# Patient Record
Sex: FEMALE | Employment: OTHER | URBAN - METROPOLITAN AREA
[De-identification: names, ages, dates, MRNs, and addresses within clinical notes are randomized per-mention and may not be internally consistent; named-entity substitution may affect disease eponyms.]

---

## 2019-09-10 ENCOUNTER — OFFICE VISIT (OUTPATIENT)
Dept: ORTHOPEDIC SURGERY | Age: 79
End: 2019-09-10

## 2019-09-10 VITALS
HEART RATE: 68 BPM | SYSTOLIC BLOOD PRESSURE: 121 MMHG | WEIGHT: 113.4 LBS | RESPIRATION RATE: 16 BRPM | DIASTOLIC BLOOD PRESSURE: 71 MMHG | OXYGEN SATURATION: 97 % | HEIGHT: 60 IN | TEMPERATURE: 96.1 F | BODY MASS INDEX: 22.26 KG/M2

## 2019-09-10 DIAGNOSIS — M75.102 LEFT ROTATOR CUFF TEAR ARTHROPATHY: Primary | ICD-10-CM

## 2019-09-10 DIAGNOSIS — M25.512 LEFT SHOULDER PAIN, UNSPECIFIED CHRONICITY: ICD-10-CM

## 2019-09-10 DIAGNOSIS — M12.812 LEFT ROTATOR CUFF TEAR ARTHROPATHY: Primary | ICD-10-CM

## 2019-09-10 RX ORDER — TRIAMCINOLONE ACETONIDE 40 MG/ML
40 INJECTION, SUSPENSION INTRA-ARTICULAR; INTRAMUSCULAR ONCE
Qty: 1 ML | Refills: 0
Start: 2019-09-10 | End: 2019-09-10

## 2019-09-10 RX ORDER — OMEPRAZOLE 40 MG/1
40 CAPSULE, DELAYED RELEASE ORAL DAILY
COMMUNITY
End: 2022-02-10

## 2019-09-10 RX ORDER — ATORVASTATIN CALCIUM 40 MG/1
40 TABLET, FILM COATED ORAL DAILY
COMMUNITY

## 2019-09-10 RX ORDER — LOSARTAN POTASSIUM 50 MG/1
50 TABLET ORAL DAILY
COMMUNITY

## 2019-09-10 NOTE — PROGRESS NOTES
I have individually seen and examined Romero Saini in addition to the physician assistant. Assessment:    ICD-10-CM ICD-9-CM    1. Left rotator cuff tear arthropathy M75.102 716.81 TRIAMCINOLONE ACETONIDE INJ    M12.812  triamcinolone acetonide (KENALOG) 40 mg/mL injection   2. Left shoulder pain, unspecified chronicity M25.512 719.41 AMB POC XRAY, SHOULDER; COMPLETE, 2+        IMAGING: XR of left shoulder dated 9/10/19 was reviewed and read: High riding humerus      Plan:  Pt presents today with left shoulder pain due to rotator cuff arthropathy. Her shoulder was injected and aspirated with ultrasound today. She will follow up in 2-3 weeks. RTC 2-3 weeks    Scribed by Yuriy Fay [address]) as dictated by Mai Fletcher MD     I, Dr. Mai Fletcher, confirm that all documentation is accurate.      Mai Fletcher M.D.   [facility]

## 2019-09-10 NOTE — PROGRESS NOTES
1. Have you been to the ER, urgent care clinic since your last visit? Hospitalized since your last visit? No    2. Have you seen or consulted any other health care providers outside of the [address] [place] [address] since your last visit? Include any pap smears or colon screening.  No

## 2019-09-10 NOTE — PROGRESS NOTES
Nicoals Lopes  1940   Chief Complaint   Patient presents with    Shoulder Pain     LEFT SHOULDER PAIN        HISTORY OF PRESENT ILLNESS  Nicolas Lopes is a 78 y.o. female who presents today for evaluation of left shoulder pain. She states she dislocated her shoulder 1 year ago. Was doing fine until the last 2 weeks. Noted some swelling and then saw some bruising over the last few days. Has a hard time lifting. . Patient describes the pain as aching and throbbing that is Intermittent in nature. Symptoms are worse with lifting and is better with  rest.  Associated symptoms include weakness, Swelling. Since problem started, it: has worsened. Pain does not wake patient up at night. Has taken nothing for the problem. Pain is a 5/10. Has tried following treatments: Injections:NO; Brace:NO;  Therapy:NO; Cane/Crutch:NO       No Known Allergies     Past Medical History:   Diagnosis Date    Hypertension       Social History     Socioeconomic History    Marital status: UNKNOWN     Spouse name: Not on file    Number of children: Not on file    Years of education: Not on file    Highest education level: Not on file   Occupational History    Not on file   Social Needs    Financial resource strain: Not on file    Food insecurity:     Worry: Not on file     Inability: Not on file    Transportation needs:     Medical: Not on file     Non-medical: Not on file   Tobacco Use    Smoking status: Never Smoker    Smokeless tobacco: Never Used   Substance and Sexual Activity    Alcohol use: Not on file    Drug use: Not on file    Sexual activity: Not on file   Lifestyle    Physical activity:     Days per week: Not on file     Minutes per session: Not on file    Stress: Not on file   Relationships    Social connections:     Talks on phone: Not on file     Gets together: Not on file     Attends Rastafari service: Not on file     Active member of club or organization: Not on file     Attends meetings of clubs or organizations: Not on file     Relationship status: Not on file    Intimate partner violence:     Fear of current or ex partner: Not on file     Emotionally abused: Not on file     Physically abused: Not on file     Forced sexual activity: Not on file   Other Topics Concern    Not on file   Social History Narrative    Not on file      History reviewed. No pertinent surgical history. Family History   Problem Relation Age of Onset    Hypertension Mother     Stroke Mother     Hypertension Father     Heart Attack Father       Current Outpatient Medications   Medication Sig    omeprazole (PRILOSEC) 40 mg capsule Take 40 mg by mouth daily.  losartan (COZAAR) 50 mg tablet Take  by mouth daily.  atorvastatin (LIPITOR) 40 mg tablet Take  by mouth daily.  calcium-cholecalciferol, d3, (CALCIUM 600 + D) 600-125 mg-unit tab Take  by mouth.  vit C/vit E ac/lut/copper/zinc (PRESERVISION LUTEIN PO) Take  by mouth. No current facility-administered medications for this visit. REVIEW OF SYSTEM   Patient denies: Weight loss, Fever/Chills, HA, Visual changes, Fatigue, Chest pain, SOB, Abdominal pain, N/V/D/C, Blood in stool or urine, Edema. Pertinent positive as above in HPI. All others were negative    PHYSICAL EXAM:   Visit Vitals  /71   Pulse 68   Temp 96.1 °F (35.6 °C) (Oral)   Resp 16   Ht 5' (1.524 m)   Wt 113 lb 6.4 oz (51.4 kg)   SpO2 97%   BMI 22.15 kg/m²     The patient is a well-developed, well-nourished female   in no acute distress. The patient is alert and oriented times three. The patient is alert and oriented times three. Mood and affect are normal.  LYMPHATIC: lymph nodes are not enlarged and are within normal limits  SKIN: normal in color and non tender to palpation. There are no bruises or abrasions noted. NEUROLOGICAL: Motor sensory exam is within normal limits. Reflexes are equal bilaterally.  There is normal sensation to pinprick and light touch  MUSCULOSKELETAL:  Examination Left shoulder   Skin Intact   AC joint tenderness -   Biceps tenderness -   Forward flexion/Elevation    Active abduction    Glenohumeral abduction 80   External rotation ROM 30   Internal rotation ROM 30   Apprehension -   Tracies Relocation -   Jerk -   Load and Shift -   Obriens -   Speeds -   Impingement sign -   Supraspinatus/Empty Can +   External Rotation Strength -, 5/5   Lift Off/Belly Press -, 5/5   Neurovascular Intact          PROCEDURE: Left shoulder Aspiration and Injection with Ultrasound Guidance     After sterile prep, left shoulder was aspirated. 10 cc of bloody serous fluid were obtained under ultrasound guidance. The fluid was discarded. After sterile prep, 6 cc of Xylocaine and 1 cc of Kenalog were injected into the left shoulder. Ultrasound images captured using Scloby Ultrasound machine and scanned into patient's chart.        [facility] [place]  OFFICE PROCEDURE PROGRESS NOTE        Chart reviewed for the following:  Kori Hoover M.D, have reviewed the History, Physical and updated the Allergic reactions for Christy Maza performed immediately prior to start of procedure:  Kori Hoover M.D, have performed the following reviews on Kevon Lew prior to the start of the procedure:            * Patient was identified by name and date of birth   * Agreement on procedure being performed was verified  * Risks and Benefits explained to the patient  * Procedure site verified and marked as necessary  * Patient was positioned for comfort  * Consent was signed and verified     Time: 1:44 PM     Date of procedure: 9/10/2019    Procedure performed by:  Santo Pinedo M.D    Provider assisted by: (see medication administration)    How tolerated by patient: tolerated the procedure well with no complications    Comments: none        IMAGING: 3 view xray images of left shoulder  on 9/10/2019 read and reviewed by myself reveal high riding humerus c/w cuff tear arthropathy     IMPRESSION:      ICD-10-CM ICD-9-CM    1. Left rotator cuff tear arthropathy M75.102 716.81     M12.812     2. Left shoulder pain, unspecified chronicity M25.512 719.41 AMB POC XRAY, SHOULDER; COMPLETE, 2+        PLAN:   1. Patient with cuff tear arthropathy and hemarthrosis. Shoulder examined under ultrasound aspirated and injected with cortisone. All activities as tolerated  Risk factors include: previous dislocation  2. Yes cortisone injection indicated today   3. No Physical/Occupational Therapy indicated today  4. No diagnostic test indicated today:   5. No durable medical equipment indicated today  6. No referral indicated today   7. No medications indicated today:   8.  No Narcotic indicated today   RTC 3 weeks     Claudetta Pollock, PA-C [facility]

## 2019-09-12 ENCOUNTER — OFFICE VISIT (OUTPATIENT)
Dept: ORTHOPEDIC SURGERY | Age: 79
End: 2019-09-12

## 2019-09-12 ENCOUNTER — HOSPITAL ENCOUNTER (OUTPATIENT)
Age: 79
Discharge: HOME | End: 2019-09-12
Attending: PHYSICIAN ASSISTANT
Payer: MEDICARE

## 2019-09-12 VITALS
BODY MASS INDEX: 22.1 KG/M2 | WEIGHT: 112.6 LBS | TEMPERATURE: 96.2 F | DIASTOLIC BLOOD PRESSURE: 97 MMHG | HEIGHT: 60 IN | HEART RATE: 82 BPM | OXYGEN SATURATION: 99 % | SYSTOLIC BLOOD PRESSURE: 189 MMHG | RESPIRATION RATE: 15 BRPM

## 2019-09-12 DIAGNOSIS — M12.812 LEFT ROTATOR CUFF TEAR ARTHROPATHY: Primary | ICD-10-CM

## 2019-09-12 DIAGNOSIS — M25.512 LEFT SHOULDER PAIN, UNSPECIFIED CHRONICITY: ICD-10-CM

## 2019-09-12 DIAGNOSIS — M12.812 LEFT ROTATOR CUFF TEAR ARTHROPATHY: ICD-10-CM

## 2019-09-12 DIAGNOSIS — M75.102 LEFT ROTATOR CUFF TEAR ARTHROPATHY: Primary | ICD-10-CM

## 2019-09-12 DIAGNOSIS — M75.102 LEFT ROTATOR CUFF TEAR ARTHROPATHY: ICD-10-CM

## 2019-09-12 PROCEDURE — 73221 MRI JOINT UPR EXTREM W/O DYE: CPT

## 2019-09-12 NOTE — PROGRESS NOTES
1. Have you been to the ER, urgent care clinic since your last visit? Hospitalized since your last visit? No    2. Have you seen or consulted any other health care providers outside of the [address] [place] [address] since your last visit? Include any pap smears or colon screening.  No

## 2019-09-12 NOTE — PROGRESS NOTES
Patsy Barnes  1940   Chief Complaint   Patient presents with    Arm Pain     LEFT ARM PAIN        HISTORY OF PRESENT ILLNESS  Patsy Barnes is a 78 y.o. female who presents today for evaluation of left shoulder pain. . She notes in the last day or two her swelling has now moved to her arm and the bruising is worse. Pain is worse when she tries to lift her arm or lift any object. Night pain Is present. Patient denies any fever, chills, chest pain, shortness of breath or calf pain. The remainder of the review of systems is negative. There are no new illness or injuries to report since last seen in the office. No changes in medications, allergies, social or family history. PHYSICAL EXAM:   Visit Vitals  BP (!) 189/97   Pulse 82   Temp 96.2 °F (35.7 °C) (Oral)   Resp 15   Ht 5' (1.524 m)   Wt 112 lb 9.6 oz (51.1 kg)   SpO2 99%   BMI 21.99 kg/m²     The patient is a well-developed, well-nourished female   in no acute distress. The patient is alert and oriented times three. The patient is alert and oriented times three. Mood and affect are normal.  LYMPHATIC: lymph nodes are not enlarged and are within normal limits  SKIN: normal in color and non tender to palpation. There are no bruises or abrasions noted. NEUROLOGICAL: Motor sensory exam is within normal limits. Reflexes are equal bilaterally.  There is normal sensation to pinprick and light touch  MUSCULOSKELETAL:  Examination Left shoulder   Skin Intact, swelling and bruising noted to arm   AC joint tenderness -   Biceps tenderness -   Forward flexion/Elevation    Active abduction    Glenohumeral abduction 80   External rotation ROM 30   Internal rotation ROM 30   Apprehension -   Tracies Relocation -   Jerk -   Load and Shift -   Obriens -   Speeds -   Impingement sign -   Supraspinatus/Empty Can +   External Rotation Strength -, 5/5   Lift Off/Belly Press -, 5/5   Neurovascular Intact              IMPRESSION:      ICD-10-CM ICD-9-CM    1. Left rotator cuff tear arthropathy M75.102 716.81 MRI SHOULDER LT WO CONT    M12.812  CANCELED: MRI SHOULDER RT WO CONT   2. Left shoulder pain, unspecified chronicity M25.512 719.41 MRI SHOULDER LT WO CONT      CANCELED: MRI SHOULDER RT WO CONT        PLAN:   1. Patient with cuff tear arthropathy and hemarthrosis worsening. Will get an MRI today to eval soft tissues  Risk factors include: previous dislocation  2. No cortisone injection indicated today   3. No Physical/Occupational Therapy indicated today  4. YES diagnostic test indicated today: MRI  5. No durable medical equipment indicated today  6. No referral indicated today   7. No medications indicated today:   8.  No Narcotic indicated today   RTC after MRI    [facility]

## 2019-09-16 ENCOUNTER — OFFICE VISIT (OUTPATIENT)
Dept: ORTHOPEDIC SURGERY | Age: 79
End: 2019-09-16

## 2019-09-16 VITALS
BODY MASS INDEX: 22.74 KG/M2 | TEMPERATURE: 96.8 F | OXYGEN SATURATION: 99 % | RESPIRATION RATE: 12 BRPM | WEIGHT: 115.8 LBS | HEART RATE: 85 BPM | HEIGHT: 60 IN | SYSTOLIC BLOOD PRESSURE: 140 MMHG | DIASTOLIC BLOOD PRESSURE: 71 MMHG

## 2019-09-16 DIAGNOSIS — M12.812 LEFT ROTATOR CUFF TEAR ARTHROPATHY: Primary | ICD-10-CM

## 2019-09-16 DIAGNOSIS — M75.102 LEFT ROTATOR CUFF TEAR ARTHROPATHY: Primary | ICD-10-CM

## 2019-09-16 NOTE — PROGRESS NOTES
1. Have you been to the ER, urgent care clinic since your last visit? Hospitalized since your last visit? No    2. Have you seen or consulted any other health care providers outside of the [address] [place] [address] since your last visit? Include any pap smears or colon screening.  No

## 2019-09-16 NOTE — PROGRESS NOTES
Tracy Gonzalez  1940   Chief Complaint   Patient presents with    Shoulder Pain     left shoulder        HISTORY OF PRESENT ILLNESS  Tracy Gonzalez is a 78 y.o. female who presents today for evaluation of left shoulder pain and MRI review. Pt rates pain as 0/10 today. Pt fell over a year ago but the pain has worsened over the last couple of weeks. Pt has been caring for her  which may have exacerbated the pain. She states she has been lifting him and pushing his wheelchair, which aggravates the pain. Pain is worse when she tries to lift her arm or lift any object. Night pain Is present. There is still swelling in the shoulder today. Surgery was discussed with the patient today but they would like to think about it before moving forward. Patient denies any fever, chills, chest pain, shortness of breath or calf pain. The remainder of the review of systems is negative. There are no new illness or injuries to report since last seen in the office. No changes in medications, allergies, social or family history. PHYSICAL EXAM:   Visit Vitals  /71   Pulse 85   Temp 96.8 °F (36 °C)   Resp 12   Ht 5' (1.524 m)   Wt 115 lb 12.8 oz (52.5 kg)   SpO2 99%   BMI 22.62 kg/m²     The patient is a well-developed, well-nourished female   in no acute distress. The patient is alert and oriented times three. The patient is alert and oriented times three. Mood and affect are normal.  LYMPHATIC: lymph nodes are not enlarged and are within normal limits  SKIN: normal in color and non tender to palpation. There are no bruises or abrasions noted. NEUROLOGICAL: Motor sensory exam is within normal limits. Reflexes are equal bilaterally.  There is normal sensation to pinprick and light touch  MUSCULOSKELETAL:  Examination Left shoulder   Skin Intact, swelling and bruising noted to arm   AC joint tenderness -   Biceps tenderness -   Forward flexion/Elevation    Active abduction    Glenohumeral abduction 80   External rotation ROM 30   Internal rotation ROM 30   Apprehension -   Tracies Relocation -   Jerk -   Load and Shift -   Obriens -   Speeds -   Impingement sign -   Supraspinatus/Empty Can +   External Rotation Strength -, 5/5   Lift Off/Belly Press -, 5/5   Neurovascular Intact        IMAGING: MRI of left shoulder dated 9/12/19 was reviewed and read:   IMPRESSION:   1. Large-caliber full-thickness rotator cuff tear involving most if not all of  both supraspinatus and infraspinatus. Both muscles with significant atrophy. 2. Longitudinal split tear is suspected within distal superior supraspinatus. 3. Medial subluxation of biceps tendon. 4. Poor resolution of biceps labral anchor. There is superior labral  degenerative morphology tearing. 5. Moderate AC joint arthritis. 6. Moderate volume communicating bursitis and joint effusion. IMPRESSION:      ICD-10-CM ICD-9-CM    1. Left rotator cuff tear arthropathy M75.102 716.81     M12.812          PLAN:   1. Patient presents today with left shoulder pain due to MRI-documented rotator cuff tear arthropathy. Surgery was discussed with the patient today but they would like to think about it before moving forward. Risk factors include: previous dislocation  2. No cortisone injection indicated today   3. No Physical/Occupational Therapy indicated today  4. No diagnostic test indicated today:   5. No durable medical equipment indicated today  6. No referral indicated today   7. No medications indicated today:   8. No Narcotic indicated today       RTC after deciding on surgery    Scribed by [address]) as dictated by Padilla Johnson MD    I, Dr. Padilla Johnson, confirm that all documentation is accurate.     Padilla Johnson M.D.   [facility]

## 2019-09-19 ENCOUNTER — TELEPHONE (OUTPATIENT)
Dept: ORTHOPEDIC SURGERY | Age: 79
End: 2019-09-19

## 2019-09-19 NOTE — TELEPHONE ENCOUNTER
Patient seen 09/16/2019 for STAT mri results. Patient needs to know what can she do for the knot on her shldr. Should she use ice? Or what can be done?   Please call her today asap at [phone]

## 2020-07-28 ENCOUNTER — HOSPITAL ENCOUNTER (OUTPATIENT)
Dept: MAMMOGRAPHY | Age: 80
Discharge: HOME | End: 2020-07-28
Attending: INTERNAL MEDICINE
Payer: MEDICARE

## 2020-07-28 DIAGNOSIS — Z12.31 VISIT FOR SCREENING MAMMOGRAM: ICD-10-CM

## 2020-07-28 PROCEDURE — 77063 BREAST TOMOSYNTHESIS BI: CPT

## 2020-11-18 ENCOUNTER — APPOINTMENT (OUTPATIENT)
Dept: PHYSICAL THERAPY | Age: 80
End: 2020-11-18

## 2020-11-20 ENCOUNTER — APPOINTMENT (OUTPATIENT)
Dept: PHYSICAL THERAPY | Age: 80
End: 2020-11-20

## 2020-12-04 ENCOUNTER — HOSPITAL ENCOUNTER (OUTPATIENT)
Dept: PHYSICAL THERAPY | Age: 80
Discharge: HOME | End: 2020-12-04
Payer: MEDICARE

## 2020-12-04 PROCEDURE — 97161 PT EVAL LOW COMPLEX 20 MIN: CPT

## 2020-12-04 PROCEDURE — 97530 THERAPEUTIC ACTIVITIES: CPT

## 2020-12-04 NOTE — PROGRESS NOTES
[facility], [address] [place] [address]  [phone] fax  Plan of Care/ Statement of Necessity for Physical Therapy Services    Patient name: Jerry Simental Start of Care: 2020   Referral source: Phu Shannon MD : 1940    Medical Diagnosis: Low back pain [M54.5]  Scoliosis [M41.9]  Payor: Mackenzie Justice / Plan: [place] MED[place] A & B / Product Type: Medicare /  Onset Date:10/28/20    Treatment Diagnosis: low back pain, posterior right hip pain   Prior Hospitalization: see medical history Provider#: [id number]   Medications: Verified on Patient summary List    Comorbidities: HTN, visually impaired, hearing impaired, left shoulder pain   Prior Level of Function: functionally independent, caring for  at home who requires help with transfer/safety with gait      The Plan of Care and following information is based on the information from the initial evaluation. Assessment/ key information: Pt is a pleasant 78 y.o. female who presents with c/o low back pain and right hip pain. The patient reports an insidious onset of low back pain and posterior right hip pain over the past 6-8 months that is exacerbated by prolonged sitting and assisting her  at home with transfers. Signs/symptoms at eval consistent with mechanical low back pain, likely multifactorial in nature due to impaired hip strength, increased sedentary nature of lifestyle, and difficulty helping her  with transfers/gait. Functional deficits include: impaired B hip extension strength, difficulty lifting her dog without pain, and restrictions in ability to assist her  due to strength limitations. Rehab potential is good due to pt's desire to return to PLOF. Pt would benefit from skilled PT to address above deficits to improve Pt's function and ability to return to PLOF household tasks with decreased pain.     Evaluation Complexity History HIGH Complexity :3+ comorbidities / personal factors will impact the outcome/ POC ; Examination LOW Complexity : 1-2 Standardized tests and measures addressing body structure, function, activity limitation and / or participation in recreation  ;Presentation LOW Complexity : Stable, uncomplicated  ;Clinical Decision Making MEDIUM Complexity : FOTO score of 26-74  Overall Complexity Rating: LOW   Problem List: pain affecting function, decrease ROM, decrease strength, impaired gait/ balance, decrease ADL/ functional abilitiies, decrease activity tolerance, decrease flexibility/ joint mobility and decrease transfer abilities   Treatment Plan may include any combination of the following: Therapeutic exercise, Therapeutic activities, Neuromuscular re-education, Physical agent/modality, Gait/balance training, Manual therapy, Aquatic therapy, Patient education, Self Care training, Functional mobility training, Home safety training and Stair training  Patient / Family readiness to learn indicated by: asking questions  Persons(s) to be included in education: patient (P)  Barriers to Learning/Limitations: None  Patient Goal (s): decrease pain  Patient Self Reported Health Status: good  Rehabilitation Potential: good    Short Term Goals: To be accomplished in 1 week  - Goal: Pt to be compliant with initial HEP to improve hip strength to improve ease of transfers. Status at last note/certification: Established and reviewed with Pt  Long Term Goals: To be accomplished in 10 treatments  - Goal: Pt to perform 5 floor<>waist lifts with 20 lbs resistance without increased low back pain to facilitate ease with lifting her dog at home. Status at last note/certification: pain lifting her dog out of bed in the morning  - Goal: Pt to report at least 50% improvement in overall symptoms to improve ease of helping her  transfer safely at home.   Status at last note/certification: N/A  - Goal: Pt to demonstrate 5/5 bilateral hip extension MMT to increase ease of transfers/gait. Status at last note/certification: 4/5 B hip extension  - Goal: Pt to report FOTO score of at least 67 pts to demonstrate improved function and quality of life. Status at last note/certification: FOTO 56 pts       Frequency / Duration: Patient to be seen 1-2 times per week for 10 treatments. Patient/ Caregiver education and instruction: Diagnosis, prognosis, exercises   [x]  Plan of care has been reviewed with PTA    Certification Period: 12/4/20-1/2/21  Jennifer Tovar 12/4/2020 2:56 PM  _____________________________________________________________________  I certify that the above Therapy Services are being furnished while the patient is under my care. I agree with the treatment plan and certify that this therapy is necessary.     Physician's Signature:____________Date:_________TIME:________    ** Signature, Date and Time must be completed for valid certification **    Please sign and return to [facility] [address]  [phone] fax

## 2020-12-04 NOTE — PROGRESS NOTES
PT DAILY TREATMENT NOTE 10-18    Patient Name: Fanny Morse  Date:2020  : 1940  [x]  Patient  Verified  Payor: [place] MEDICARE / Plan: [place] MEDICARE PART A & B / Product Type: Medicare /    In time:2:19  Out time:2:51  Total Treatment Time (min): 32  Visit #: 1 of 10    Medicare/BCBS Only   Total Timed Codes (min):  15 1:1 Treatment Time:  32       Treatment Area: Low back pain [M54.5]  Scoliosis [M41.9]    SUBJECTIVE  Pain Level (0-10 scale): 2   Any medication changes, allergies to medications, adverse drug reactions, diagnosis change, or new procedure performed?: [x] No    [] Yes (see summary sheet for update)  Subjective functional status/changes:   [] No changes reported  See POC    OBJECTIVE    17 min [x]Eval                  []Re-Eval       5 min Therapeutic Exercise:  [] See flow sheet :   Rationale: increase ROM and increase strength to improve the patients ability to perform transfers with decreased pain    10 min Therapeutic Activity:  []  See flow sheet : Patient education on therapy assessment, prognosis, expectations for therapy sessions, patient goals, role of hip weakness in generating LBP, and HEP. Rationale: to improve the patients ability to adhere to HEP and therapy sessions for increased compliance when working toward therapy goals.             With   [] TE   [x] TA   [] neuro   [] other: Patient Education: [x] Review HEP    [] Progressed/Changed HEP based on:   [] positioning   [] body mechanics   [] transfers   [] heat/ice application    [] other:      Other Objective/Functional Measures: See POC     Pain Level (0-10 scale) post treatment: 2    ASSESSMENT/Changes in Function: See POC    Patient will continue to benefit from skilled PT services to modify and progress therapeutic interventions, address functional mobility deficits, address ROM deficits, address strength deficits, analyze and address soft tissue restrictions, analyze and cue movement patterns, analyze and modify body mechanics/ergonomics, assess and modify postural abnormalities, address imbalance/dizziness and instruct in home and community integration to attain remaining goals. [x]  See Plan of Care  []  See progress note/recertification  []  See Discharge Summary         Progress towards goals / Updated goals:  See POC    PLAN  [x]  Upgrade activities as tolerated     []  Continue plan of care  [x]  Update interventions per flow sheet       []  Discharge due to:_  []  Other:_      Mariluz Sanches 12/4/2020  2:55 PM    No future appointments.

## 2020-12-08 NOTE — PROGRESS NOTES
[facility], [address] [place] [address]  [phone] fax    Discharge Summary      Patient name: Staci Stewart Start of Care: 20   Referral source: Leona Bruno MD : 1940   Medical/Treatment Diagnosis: Low back pain [M54.5]  Scoliosis [M41.9]  Payor: Allie Gonzalez / Plan: [place] MED[place] & B / Product Type: Medicare /  Onset Date:10/28/20     Prior Hospitalization: see medical history Provider#: [id number]   Medications: Verified on Patient Summary List    Comorbidities: HTN, visually impaired, hearing impaired, left shoulder pain   Prior Level of Function: functionally independent, caring for  at home who requires help with transfer/safety with gait    Visits from Start of Care: 1    Missed Visits: 0  Reporting Period : 20 to 20    Short Term Goals: To be accomplished in 1 week  - Goal: Pt to be compliant with initial HEP to improve hip strength to improve ease of transfers. Status at last note/certification: Established and reviewed with Pt  Current: unable to reassess due to unexpected discharge  Long Term Goals: To be accomplished in 10 treatments  - Goal: Pt to perform 5 floor<>waist lifts with 20 lbs resistance without increased low back pain to facilitate ease with lifting her dog at home. Status at last note/certification: pain lifting her dog out of bed in the morning  Current: unable to reassess due to unexpected discharge  - Goal: Pt to report at least 50% improvement in overall symptoms to improve ease of helping her  transfer safely at home. Status at last note/certification: N/A  Current: unable to reassess due to unexpected discharge  - Goal: Pt to demonstrate 5/5 bilateral hip extension MMT to increase ease of transfers/gait.   Status at last note/certification: 4/5 B hip extension  Current: unable to reassess due to unexpected discharge  - Goal: Pt to report FOTO score of at least 67 pts to demonstrate improved function and quality of life. Status at last note/certification: FOTO 56 pts   Current: unable to reassess due to unexpected discharge    Assessment/ Summary of Care: The patient attended evaluation session for treatment of low back pain and posterior right hip pain. She informed us after her evaluation appointment that she is not interested in pursuing further treatment as she was extremely sore after her initial evaluation session. She will thus be discharged without further instruction.      RECOMMENDATIONS:  [x]Discontinue therapy: []Patient has reached or is progressing toward set goals      [x]Patient is non-compliant or has abdicated      []Due to lack of appreciable progress towards set goals    Loretta Alvarez 12/8/2020 1:46 PM

## 2020-12-09 ENCOUNTER — APPOINTMENT (OUTPATIENT)
Dept: PHYSICAL THERAPY | Age: 80
End: 2020-12-09
Payer: MEDICARE

## 2020-12-14 ENCOUNTER — APPOINTMENT (OUTPATIENT)
Dept: PHYSICAL THERAPY | Age: 80
End: 2020-12-14
Payer: MEDICARE

## 2020-12-16 ENCOUNTER — APPOINTMENT (OUTPATIENT)
Dept: PHYSICAL THERAPY | Age: 80
End: 2020-12-16
Payer: MEDICARE

## 2022-02-10 ENCOUNTER — OFFICE VISIT (OUTPATIENT)
Dept: ORTHOPEDIC SURGERY | Age: 82
End: 2022-02-10
Payer: MEDICARE

## 2022-02-10 VITALS
WEIGHT: 115 LBS | TEMPERATURE: 97.6 F | OXYGEN SATURATION: 100 % | HEART RATE: 81 BPM | BODY MASS INDEX: 22.58 KG/M2 | HEIGHT: 60 IN

## 2022-02-10 DIAGNOSIS — M53.3 COCCYX PAIN: Primary | ICD-10-CM

## 2022-02-10 DIAGNOSIS — G56.01 CARPAL TUNNEL SYNDROME OF RIGHT WRIST: ICD-10-CM

## 2022-02-10 DIAGNOSIS — M48.061 SPINAL STENOSIS OF LUMBAR REGION WITHOUT NEUROGENIC CLAUDICATION: ICD-10-CM

## 2022-02-10 PROCEDURE — 1090F PRES/ABSN URINE INCON ASSESS: CPT | Performed by: PHYSICAL MEDICINE & REHABILITATION

## 2022-02-10 PROCEDURE — G8427 DOCREV CUR MEDS BY ELIG CLIN: HCPCS | Performed by: PHYSICAL MEDICINE & REHABILITATION

## 2022-02-10 PROCEDURE — G8400 PT W/DXA NO RESULTS DOC: HCPCS | Performed by: PHYSICAL MEDICINE & REHABILITATION

## 2022-02-10 PROCEDURE — G8420 CALC BMI NORM PARAMETERS: HCPCS | Performed by: PHYSICAL MEDICINE & REHABILITATION

## 2022-02-10 PROCEDURE — 99204 OFFICE O/P NEW MOD 45 MIN: CPT | Performed by: PHYSICAL MEDICINE & REHABILITATION

## 2022-02-10 PROCEDURE — G8536 NO DOC ELDER MAL SCRN: HCPCS | Performed by: PHYSICAL MEDICINE & REHABILITATION

## 2022-02-10 PROCEDURE — 1101F PT FALLS ASSESS-DOCD LE1/YR: CPT | Performed by: PHYSICAL MEDICINE & REHABILITATION

## 2022-02-10 PROCEDURE — G8432 DEP SCR NOT DOC, RNG: HCPCS | Performed by: PHYSICAL MEDICINE & REHABILITATION

## 2022-02-10 RX ORDER — LIDOCAINE 50 MG/G
PATCH TOPICAL
COMMUNITY
Start: 2021-12-30 | End: 2022-04-13 | Stop reason: SDUPTHER

## 2022-02-10 RX ORDER — ESCITALOPRAM OXALATE 5 MG/1
5 TABLET ORAL DAILY
COMMUNITY
Start: 2021-12-28 | End: 2022-10-10 | Stop reason: DRUGHIGH

## 2022-02-10 RX ORDER — METHYLPREDNISOLONE 4 MG/1
TABLET ORAL
COMMUNITY
Start: 2021-11-17 | End: 2022-02-10

## 2022-02-10 RX ORDER — PANTOPRAZOLE SODIUM 40 MG/1
20 TABLET, DELAYED RELEASE ORAL DAILY
COMMUNITY
End: 2022-10-25 | Stop reason: SDUPTHER

## 2022-02-10 RX ORDER — VALSARTAN AND HYDROCHLOROTHIAZIDE 80; 12.5 MG/1; MG/1
1 TABLET, FILM COATED ORAL DAILY
COMMUNITY
End: 2022-02-10

## 2022-02-10 RX ORDER — PREGABALIN 50 MG/1
50 CAPSULE ORAL 2 TIMES DAILY
Qty: 60 CAPSULE | Refills: 1 | Status: SHIPPED | OUTPATIENT
Start: 2022-02-10 | End: 2022-03-14 | Stop reason: SINTOL

## 2022-02-10 NOTE — LETTER
2/11/2022    Patient: Marylu Grimm   YOB: 1940   Date of Visit: 2/10/2022     Vianey June MD  [address] [place] [id number]  Via Fax: [phone]    Dear Vianey June MD,      Thank you for referring Ms. Harjit Rinaldi to [facility] for evaluation. My notes for this consultation are attached. If you have questions, please do not hesitate to call me. I look forward to following your patient along with you.       Sincerely,    Milagros Simms MD

## 2022-02-10 NOTE — PROGRESS NOTES
Pedro Wayne presents today for   Chief Complaint   Patient presents with    Back Pain       Is someone accompanying this pt? no    Is the patient using any DME equipment during OV? no    Depression Screening:  3 most recent PHQ Screens 9/16/2019   Little interest or pleasure in doing things Not at all   Feeling down, depressed, irritable, or hopeless Not at all   Total Score PHQ 2 0       Learning Assessment:  No flowsheet data found. Abuse Screening:  No flowsheet data found. Fall Risk  Fall Risk Assessment, last 12 mths 9/16/2019   Able to walk? Yes   Fall in past 12 months? No       OPIOID RISK TOOL  No flowsheet data found. Coordination of Care:  1. Have you been to the ER, urgent care clinic since your last visit? Yes epidural injection 01/2022  Hospitalized since your last visit? no    2. Have you seen or consulted any other health care providers outside of the [address] [place] [address] since your last visit? no Include any pap smears or colon screening.  no

## 2022-02-10 NOTE — PATIENT INSTRUCTIONS
Carpal Tunnel Syndrome: Exercises  Introduction  Here are some examples of exercises for you to try. The exercises may be suggested for a condition or for rehabilitation. Start each exercise slowly. Ease off the exercises if you start to have pain. You will be told when to start these exercises and which ones will work best for you. Warm-up stretches  When you no longer have pain or numbness, you can do exercises to help prevent carpal tunnel syndrome from coming back. Do not do any stretch or movement that is uncomfortable or painful. 1. Rotate your wrist up, down, and from side to side. Repeat 4 times. 2. Stretch your fingers far apart. Relax them, and then stretch them again. Repeat 4 times. 3. Stretch your thumb by pulling it back gently, holding it, and then releasing it. Repeat 4 times. How to do the exercises  Prayer stretch    1. Start with your palms together in front of your chest just below your chin. 2. Slowly lower your hands toward your waistline, keeping your hands close to your stomach and your palms together until you feel a mild to moderate stretch under your forearms. 3. Hold for at least 15 to 30 seconds. Repeat 2 to 4 times. Wrist flexor stretch    1. Extend your arm in front of you with your palm up. 2. Bend your wrist, pointing your hand toward the floor. 3. With your other hand, gently bend your wrist farther until you feel a mild to moderate stretch in your forearm. 4. Hold for at least 15 to 30 seconds. Repeat 2 to 4 times. Wrist extensor stretch    1. Repeat steps 1 through 4 of the stretch above, but begin with your extended hand palm down. Follow-up care is a key part of your treatment and safety. Be sure to make and go to all appointments, and call your doctor if you are having problems. It's also a good idea to know your test results and keep a list of the medicines you take. Where can you learn more?   Go to http://www.gray.com/  Enter I846 in the search box to learn more about \"Carpal Tunnel Syndrome: Exercises. \"  Current as of: July 1, 2021               Content Version: 13.0  © 2006-2021 [employer]. Care instructions adapted under license by [employer] (which disclaims liability or warranty for this information). If you have questions about a medical condition or this instruction, always ask your healthcare professional. [facility] any warranty or liability for your use of this information. Carpal Tunnel Syndrome: Exercises  Introduction  Here are some examples of exercises for you to try. The exercises may be suggested for a condition or for rehabilitation. Start each exercise slowly. Ease off the exercises if you start to have pain. You will be told when to start these exercises and which ones will work best for you. Warm-up stretches  When you no longer have pain or numbness, you can do exercises to help prevent carpal tunnel syndrome from coming back. Do not do any stretch or movement that is uncomfortable or painful. 4. Rotate your wrist up, down, and from side to side. Repeat 4 times. 5. Stretch your fingers far apart. Relax them, and then stretch them again. Repeat 4 times. 6. Stretch your thumb by pulling it back gently, holding it, and then releasing it. Repeat 4 times. How to do the exercises  Prayer stretch    4. Start with your palms together in front of your chest just below your chin. 5. Slowly lower your hands toward your waistline, keeping your hands close to your stomach and your palms together until you feel a mild to moderate stretch under your forearms. 6. Hold for at least 15 to 30 seconds. Repeat 2 to 4 times. Wrist flexor stretch    5. Extend your arm in front of you with your palm up. 6. Bend your wrist, pointing your hand toward the floor.   7. With your other hand, gently bend your wrist farther until you feel a mild to moderate stretch in your forearm. 8. Hold for at least 15 to 30 seconds. Repeat 2 to 4 times. Wrist extensor stretch    2. Repeat steps 1 through 4 of the stretch above, but begin with your extended hand palm down. Follow-up care is a key part of your treatment and safety. Be sure to make and go to all appointments, and call your doctor if you are having problems. It's also a good idea to know your test results and keep a list of the medicines you take. Where can you learn more? Go to http://www.gray.com/  Enter A848 in the search box to learn more about \"Carpal Tunnel Syndrome: Exercises. \"  Current as of: July 1, 2021               Content Version: 13.0  © 2006-2021 [employer]. Care instructions adapted under license by [employer] (which disclaims liability or warranty for this information). If you have questions about a medical condition or this instruction, always ask your healthcare professional. [place] [address] any warranty or liability for your use of this information. Carpal Tunnel Syndrome: Exercises  Introduction  Here are some examples of exercises for you to try. The exercises may be suggested for a condition or for rehabilitation. Start each exercise slowly. Ease off the exercises if you start to have pain. You will be told when to start these exercises and which ones will work best for you. Warm-up stretches  When you no longer have pain or numbness, you can do exercises to help prevent carpal tunnel syndrome from coming back. Do not do any stretch or movement that is uncomfortable or painful. 7. Rotate your wrist up, down, and from side to side. Repeat 4 times. 8. Stretch your fingers far apart. Relax them, and then stretch them again. Repeat 4 times. 9. Stretch your thumb by pulling it back gently, holding it, and then releasing it. Repeat 4 times. How to do the exercises  Prayer stretch    7.  Start with your palms together in front of your chest just below your chin. 8. Slowly lower your hands toward your waistline, keeping your hands close to your stomach and your palms together until you feel a mild to moderate stretch under your forearms. 9. Hold for at least 15 to 30 seconds. Repeat 2 to 4 times. Wrist flexor stretch    9. Extend your arm in front of you with your palm up. 8. Bend your wrist, pointing your hand toward the floor. 11. With your other hand, gently bend your wrist farther until you feel a mild to moderate stretch in your forearm. 12. Hold for at least 15 to 30 seconds. Repeat 2 to 4 times. Wrist extensor stretch    3. Repeat steps 1 through 4 of the stretch above, but begin with your extended hand palm down. Follow-up care is a key part of your treatment and safety. Be sure to make and go to all appointments, and call your doctor if you are having problems. It's also a good idea to know your test results and keep a list of the medicines you take. Where can you learn more? Go to http://www.gray.com/  Enter G474 in the search box to learn more about \"Carpal Tunnel Syndrome: Exercises. \"  Current as of: July 1, 2021               Content Version: 13.0  © 2137-3156 Healthwise, Incorporated. Care instructions adapted under license by [employer] (which disclaims liability or warranty for this information). If you have questions about a medical condition or this instruction, always ask your healthcare professional. [place] [address] any warranty or liability for your use of this information.

## 2022-02-10 NOTE — PROGRESS NOTES
[address]  [address], [address]  [place], [address] [place] [address] Street  Phone: [phone]  Fax: [phone]        Gloria Booth  : 1940  PCP: Martir Negro MD    NEW PATIENT EVALUATION      ASSESSMENT AND PLAN    Diagnoses and all orders for this visit:    1. Coccyx pain  -     pregabalin (Lyrica) 50 mg capsule; Take 1 Capsule by mouth two (2) times a day. Max Daily Amount: 100 mg. Month 1: one po qhs x 1 week, then increase to one po bid thereafter    2. Spinal stenosis of lumbar region without neurogenic claudication  -     pregabalin (Lyrica) 50 mg capsule; Take 1 Capsule by mouth two (2) times a day. Max Daily Amount: 100 mg. Month 1: one po qhs x 1 week, then increase to one po bid thereafter    3. Carpal tunnel syndrome of right wrist  -     pregabalin (Lyrica) 50 mg capsule; Take 1 Capsule by mouth two (2) times a day. Max Daily Amount: 100 mg. Month 1: one po qhs x 1 week, then increase to one po bid thereafter         1. Leyda Elias is a 78 y.o. female with known severe lumbar stenosis still having benefit from bilateral L5 injections performed 2021. Currently her main pain is coccygeal.  Regarding her axial low back pain, she has had a positive response to 2 medial branch blocks in 2021. We briefly discussed radiofrequency ablation, similarly not her primary pain complaint at this time. I have advised her that while injections may have a role in her treatment plan, it is not prudent to receive monthly corticosteroid injections. 2. Advised to wear WFO QHS. 3. Trial of Lyrica 50 mg BID. Patient will take QHS x 1 week then increase to BID. 4. Continue daily stretches. Follow-up and Dispositions    · Return in about 4 weeks (around 3/10/2022). HISTORY OF PRESENT ILLNESS  Leyda Elias is seen today in consultation for back pain x 2 years. She reports pain radiating into her buttocks.  Her pain is exacerbated with standing and prolonged sitting. She has to place a pillow in her seat when sitting to alleviate the pressure on her back. She also has intermittent right calf pain x 6-8 months. Patient states her calf pain worsens as the day progresses. Denies any radiating pain into her thighs. She has numbness and tingling in her right digits 1/2/3 x 3-4 days. She has intermittent insomnia due to pain. Patient is transferring her care from [facility] in [address] [place] [address] due to proximity to her home in [place]. She is here to discuss further injections. She is using Lidoderm patches PRN and Oxycodone PRN with benefit. Denies side effects. Denies persistent fevers, chills, weight changes, saddle paresthesias, and neurogenic bowel or bladder symptoms. She comes in with procedure notes from pain management in [place] as well as notes from [facility] in [address] [place] [address]. She used to live in [place] with her . He had medical issues. They now live in [place] and have a summer home in [place]. Pain Assessment  2/10/2022   Location of Pain Back;Finger   Location Modifiers -   Severity of Pain 10   Quality of Pain Other (Comment); Sharp   Quality of Pain Comment numb   Duration of Pain A few hours   Frequency of Pain Constant   Aggravating Factors Other (Comment); Standing   Aggravating Factors Comment sitting   Limiting Behavior Yes   Relieving Factors Nothing   Result of Injury No       Onset of pain: 2020, no injury      Investigations:   L MRI 7/2021: grade 1-2 listhesis L4-5 with moderate to severe stenosis, multilevel severe DDD, scoliosis  Spine surgery consult: by Dr. Fidel Jimenez,, discussed multilevel fusion L2-S1, possible vertebral augmentation due to osteoporosis. Recommended conservative management. Treatments:  Physical therapy: a little  Spinal injections: Bilateral L5 12/2021 Dr. Glendy Stephens with benefit . 10/2021 bilateral PSIS injection Dr. Fidel Jimenez with benefit .  MBB x2 L2-L5 8/2021 with benefit (Dr. Esther Parker, [facility] pain management in [facility]),   Spinal surgery- no  Beneficial medications: Hydrocodone, Lidoderm patches  Failed medications: unknown     Work Status: retired, lives in [place], [address] in [place] (previous full-time [place] resident),  has medical issues, requires assistance. Pertinent PMHx:  HTN, anxiety, GERD, rotator cuff tear. Visit Vitals  Pulse 81   Temp 97.6 °F (36.4 °C) (Temporal)   Ht 5' (1.524 m)   Wt 115 lb (52.2 kg)   SpO2 100%   BMI 22.46 kg/m²       PHYSICAL EXAM  Minimal elevation right pelvis compared to left  Pain with lumbar extension, excellent forward flexion   Moderate difficulty with tandem gait  Negative Suarez's  Negative Tinel's  Intrinsic strength intact  Paresthesias digits 1 2 and 3 right hand. DTRs 1+ B/L patella  Tender S/C junction, nontender SI joints or hip bursa      Past Medical History:   Diagnosis Date    Hypertension         History reviewed. No pertinent surgical history. Current Outpatient Medications   Medication Sig Dispense Refill    escitalopram oxalate (LEXAPRO) 5 mg tablet Take 5 mg by mouth daily.  pantoprazole (PROTONIX) 40 mg tablet Take 40 mg by mouth daily.  pregabalin (Lyrica) 50 mg capsule Take 1 Capsule by mouth two (2) times a day. Max Daily Amount: 100 mg. Month 1: one po qhs x 1 week, then increase to one po bid thereafter 60 Capsule 1    losartan (COZAAR) 50 mg tablet Take  by mouth daily.  atorvastatin (LIPITOR) 40 mg tablet Take  by mouth daily.  calcium-cholecalciferol, d3, (CALCIUM 600 + D) 600-125 mg-unit tab Take  by mouth.  vit C/vit E ac/lut/copper/zinc (PRESERVISION LUTEIN PO) Take  by mouth.  lidocaine (LIDODERM) 5 % APPLY 1 TO 3 PATCHES TOPICALLY TO THE SKIN EVERY DAY. MAY WEAR UP TO 12 HOURS.  REMOVE PATCHES FOR 12 HOURS (Patient not taking: Reported on 2/10/2022)

## 2022-03-14 ENCOUNTER — OFFICE VISIT (OUTPATIENT)
Dept: ORTHOPEDIC SURGERY | Age: 82
End: 2022-03-14
Payer: MEDICARE

## 2022-03-14 VITALS
TEMPERATURE: 97.5 F | HEIGHT: 60 IN | OXYGEN SATURATION: 99 % | HEART RATE: 77 BPM | WEIGHT: 115 LBS | BODY MASS INDEX: 22.58 KG/M2

## 2022-03-14 DIAGNOSIS — M47.816 LUMBAR SPONDYLOSIS: Primary | ICD-10-CM

## 2022-03-14 DIAGNOSIS — M48.061 SPINAL STENOSIS OF LUMBAR REGION WITHOUT NEUROGENIC CLAUDICATION: ICD-10-CM

## 2022-03-14 DIAGNOSIS — M79.662 BILATERAL CALF PAIN: ICD-10-CM

## 2022-03-14 DIAGNOSIS — M79.661 BILATERAL CALF PAIN: ICD-10-CM

## 2022-03-14 PROCEDURE — G8420 CALC BMI NORM PARAMETERS: HCPCS | Performed by: PHYSICAL MEDICINE & REHABILITATION

## 2022-03-14 PROCEDURE — G8400 PT W/DXA NO RESULTS DOC: HCPCS | Performed by: PHYSICAL MEDICINE & REHABILITATION

## 2022-03-14 PROCEDURE — 99214 OFFICE O/P EST MOD 30 MIN: CPT | Performed by: PHYSICAL MEDICINE & REHABILITATION

## 2022-03-14 PROCEDURE — 1101F PT FALLS ASSESS-DOCD LE1/YR: CPT | Performed by: PHYSICAL MEDICINE & REHABILITATION

## 2022-03-14 PROCEDURE — G8427 DOCREV CUR MEDS BY ELIG CLIN: HCPCS | Performed by: PHYSICAL MEDICINE & REHABILITATION

## 2022-03-14 PROCEDURE — G8432 DEP SCR NOT DOC, RNG: HCPCS | Performed by: PHYSICAL MEDICINE & REHABILITATION

## 2022-03-14 PROCEDURE — 1090F PRES/ABSN URINE INCON ASSESS: CPT | Performed by: PHYSICAL MEDICINE & REHABILITATION

## 2022-03-14 PROCEDURE — G8536 NO DOC ELDER MAL SCRN: HCPCS | Performed by: PHYSICAL MEDICINE & REHABILITATION

## 2022-03-14 RX ORDER — METHOCARBAMOL 500 MG/1
250-500 TABLET, FILM COATED ORAL
Qty: 60 TABLET | Refills: 1 | Status: SHIPPED | OUTPATIENT
Start: 2022-03-14 | End: 2022-05-16

## 2022-03-14 NOTE — LETTER
3/15/2022    Patient: Seble Freeman   YOB: 1940   Date of Visit: 3/14/2022     Atul Randolph MD  [address] [place] [address]  Via Fax: [phone]    Dear Atul Randolph MD,      Thank you for referring Ms. Gene Jane to [address] [place] [address] [facility] for evaluation. My notes for this consultation are attached. If you have questions, please do not hesitate to call me. I look forward to following your patient along with you.       Sincerely,    Ambrosio Sales MD

## 2022-03-14 NOTE — H&P (VIEW-ONLY)
[address]  [address], [address]  [place] [address]  Phone: [phone]  Fax: [phone]        Belgian Manual  : 1940  PCP: Kanchan Olivera MD    PROGRESS NOTE      ASSESSMENT AND PLAN    Diagnoses and all orders for this visit:    1. Lumbar spondylosis  -     SCHEDULE SURGERY    2. Spinal stenosis of lumbar region without neurogenic claudication  -     SCHEDULE SURGERY    3. Bilateral calf pain  -     methocarbamoL (ROBAXIN) 500 mg tablet; Take 0.5-1 Tablets by mouth two (2) times daily as needed for Muscle Spasm(s) or Pain. 1. Norman Rooney is a 78 y.o. female with severe stenosis but primarily with mechanical low back pain. She has had a good response to medial branch blocks last year when she lived in [place]. She would like to proceed with RF. 2. DC Lyrica  3. Trial of Baclofen 5-10 mg TID PRN  4. Continue Lidoderm patches  5. Risks, benefits, alternatives, and limitations of RFA discussed with patient. Patient wishes to proceed. 6. Schedule RFA B/L L4-L5, B/L L5-S1. Right side first    Follow-up and Dispositions    · Return for after Injections. HISTORY OF PRESENT ILLNESS      Norman Rooney is a 78 y.o. female presents for follow up of back pain. LV trial of Lyrica 50 mg BID. She reports pain radiating into her buttocks and thighs. Her pain is exacerbated with standing. She also continues to have pain in both calves. She states that her calf pain is worse in the afternoon and night. She has difficulty sleeping due to this pain. Denies sciatica, buttock, or thigh radiations from back    She is taking Lyrica 50 mg BID with no benefit. The daytime dose promoted extreme somnolence. Patient takes Hydrocodone PRN, Aspirin PRN, and Lidoderm patches with benefit. Denies side effects.     Pain Assessment  3/14/2022   Location of Pain Back;Leg   Location Modifiers Left;Right   Severity of Pain 0   Quality of Pain Sharp; Throbbing   Quality of Pain Comment -   Duration of Pain Persistent   Frequency of Pain Intermittent   Aggravating Factors Other (Comment)   Aggravating Factors Comment standing a lot in the afternoon. sitting too long   Limiting Behavior Yes   Relieving Factors Nothing   Result of Injury No         Onset of pain: 2020, no injury        Investigations:   L MRI 7/2021: grade 1-2 listhesis L4-5 with moderate to severe stenosis, multilevel severe DDD, scoliosis  Spine surgery consult: by Dr. Sammi Bullard,, discussed multilevel fusion L2-S1, possible vertebral augmentation due to osteoporosis. Recommended conservative management.     Treatments:  Physical therapy: a little  Spinal injections: Bilateral L5 12/2021 Dr. Michelle Gilliam with benefit . 10/2021 bilateral PSIS injection Dr. Sammi Bullard with benefit . MBB x2 L2L5 8/2021 with benefit (Dr. Bonny Eddy, [facility] in [facility]),   Spinal surgery- no  Beneficial medications: Hydrocodone, Lidoderm patches, aspirin   Failed medications: Lyrica (somnolence), IcyHot,      Work Status: retired, lives in [place], [place] [address] in [place] (previous full-time [place] resident),  has medical issues, requires assistance. Pertinent PMHx:  HTN, anxiety, GERD, rotator cuff tear.      PHYSICAL EXAMINATION    Visit Vitals  Pulse 77   Temp 97.5 °F (36.4 °C) (Temporal)   Ht 5' (1.524 m)   Wt 115 lb (52.2 kg)   SpO2 99% Comment: RA   BMI 22.46 kg/m²     Elderly kyphotic lady in no acute distress   LE strength intact  SLR negative  No edema  No calf tenderness  TTP L4-5, L5-S1  Increased pain with extension                Written by Ryan Peterson, as dictated by Buddy Dash MD.

## 2022-03-14 NOTE — PROGRESS NOTES
Cody Scales [facility] [address].  [address], [address]  [place], [address] [place] [address] Street  Phone: [phone]  Fax: [phone]        Destiny Medina  : 1940  PCP: Cece Ching MD    PROGRESS NOTE      ASSESSMENT AND PLAN    Diagnoses and all orders for this visit:    1. Lumbar spondylosis  -     SCHEDULE SURGERY    2. Spinal stenosis of lumbar region without neurogenic claudication  -     SCHEDULE SURGERY    3. Bilateral calf pain  -     methocarbamoL (ROBAXIN) 500 mg tablet; Take 0.5-1 Tablets by mouth two (2) times daily as needed for Muscle Spasm(s) or Pain. 1. Olivia Mendoza is a 78 y.o. female with severe stenosis but primarily with mechanical low back pain. She has had a good response to medial branch blocks last year when she lived in [place]. She would like to proceed with RF. 2. DC Lyrica  3. Trial of Baclofen 5-10 mg TID PRN  4. Continue Lidoderm patches  5. Risks, benefits, alternatives, and limitations of RFA discussed with patient. Patient wishes to proceed. 6. Schedule RFA B/L L4-L5, B/L L5-S1. Right side first    Follow-up and Dispositions    · Return for after Injections. HISTORY OF PRESENT ILLNESS      Olivia Mendoza is a 78 y.o. female presents for follow up of back pain. LV trial of Lyrica 50 mg BID. She reports pain radiating into her buttocks and thighs. Her pain is exacerbated with standing. She also continues to have pain in both calves. She states that her calf pain is worse in the afternoon and night. She has difficulty sleeping due to this pain. Denies sciatica, buttock, or thigh radiations from back    She is taking Lyrica 50 mg BID with no benefit. The daytime dose promoted extreme somnolence. Patient takes Hydrocodone PRN, Aspirin PRN, and Lidoderm patches with benefit. Denies side effects.     Pain Assessment  3/14/2022   Location of Pain Back;Leg   Location Modifiers Left;Right   Severity of Pain 0   Quality of Pain Sharp; Throbbing   Quality of Pain Comment -   Duration of Pain Persistent   Frequency of Pain Intermittent   Aggravating Factors Other (Comment)   Aggravating Factors Comment standing a lot in the afternoon. sitting too long   Limiting Behavior Yes   Relieving Factors Nothing   Result of Injury No         Onset of pain: 2020, no injury        Investigations:   L MRI 7/2021: grade 1-2 listhesis L4-5 with moderate to severe stenosis, multilevel severe DDD, scoliosis  Spine surgery consult: by Dr. Maria De Jesus Dutta,, discussed multilevel fusion L2-S1, possible vertebral augmentation due to osteoporosis. Recommended conservative management.     Treatments:  Physical therapy: a little  Spinal injections: Bilateral L5 12/2021 Dr. Faisal Ge with benefit . 10/2021 bilateral PSIS injection Dr. Maria De Jesus Dutta with benefit . MBB x2 L2-L5 8/2021 with benefit (Dr. James Bird, [facility] in [facility]),   Spinal surgery- no  Beneficial medications: Hydrocodone, Lidoderm patches, aspirin   Failed medications: Lyrica (somnolence), IcyHot,      Work Status: retired, lives in [place], [place] [address] in [place] (previous full-time [place] resident),  has medical issues, requires assistance. Pertinent PMHx:  HTN, anxiety, GERD, rotator cuff tear.      PHYSICAL EXAMINATION    Visit Vitals  Pulse 77   Temp 97.5 °F (36.4 °C) (Temporal)   Ht 5' (1.524 m)   Wt 115 lb (52.2 kg)   SpO2 99% Comment: RA   BMI 22.46 kg/m²     Elderly kyphotic lady in no acute distress   LE strength intact  SLR negative  No edema  No calf tenderness  TTP L4-5, L5-S1  Increased pain with extension                Written by Ethel Knutson, as dictated by Liv Reyes MD.

## 2022-03-14 NOTE — PROGRESS NOTES
Ruben Mathur presents today for   Chief Complaint   Patient presents with    Back Pain    Leg Pain       Is someone accompanying this pt? no    Is the patient using any DME equipment during OV? no    Depression Screening:  3 most recent PHQ Screens 9/16/2019   Little interest or pleasure in doing things Not at all   Feeling down, depressed, irritable, or hopeless Not at all   Total Score PHQ 2 0       Learning Assessment:  Learning Assessment 3/14/2022   PRIMARY LEARNER Patient   BARRIERS PRIMARY LEARNER HEARING   PRIMARY LANGUAGE ENGLISH   LEARNER PREFERENCE PRIMARY PICTURES   ANSWERED BY patient   RELATIONSHIP SELF       Abuse Screening:  Abuse Screening Questionnaire 3/14/2022   Do you ever feel afraid of your partner? N   Are you in a relationship with someone who physically or mentally threatens you? N   Is it safe for you to go home? Y       Fall Risk  Fall Risk Assessment, last 12 mths 3/14/2022   Able to walk? Yes   Fall in past 12 months? 0   Do you feel unsteady? 0   Are you worried about falling 0       Coordination of Care:  1. Have you been to the ER, urgent care clinic since your last visit? no  Hospitalized since your last visit? no    2. Have you seen or consulted any other health care providers outside of the [address] [place] [address] since your last visit? no Include any pap smears or colon screening.  no

## 2022-03-29 ENCOUNTER — APPOINTMENT (OUTPATIENT)
Dept: GENERAL RADIOLOGY | Age: 82
End: 2022-03-29
Attending: PHYSICAL MEDICINE & REHABILITATION
Payer: MEDICARE

## 2022-03-29 ENCOUNTER — HOSPITAL ENCOUNTER (OUTPATIENT)
Age: 82
Setting detail: OUTPATIENT SURGERY
Discharge: HOME | End: 2022-03-29
Attending: PHYSICAL MEDICINE & REHABILITATION | Admitting: PHYSICAL MEDICINE & REHABILITATION
Payer: MEDICARE

## 2022-03-29 VITALS
DIASTOLIC BLOOD PRESSURE: 88 MMHG | TEMPERATURE: 98 F | HEART RATE: 77 BPM | OXYGEN SATURATION: 99 % | RESPIRATION RATE: 16 BRPM | SYSTOLIC BLOOD PRESSURE: 149 MMHG

## 2022-03-29 DIAGNOSIS — M54.50 LUMBAR PAIN: ICD-10-CM

## 2022-03-29 PROCEDURE — 64635 DESTROY LUMB/SAC FACET JNT: CPT | Performed by: PHYSICAL MEDICINE & REHABILITATION

## 2022-03-29 PROCEDURE — 74011250637 HC RX REV CODE- 250/637: Performed by: PHYSICAL MEDICINE & REHABILITATION

## 2022-03-29 PROCEDURE — 64636 DESTROY L/S FACET JNT ADDL: CPT | Performed by: PHYSICAL MEDICINE & REHABILITATION

## 2022-03-29 PROCEDURE — 76010000010 HC PAIN MGT 31 TO 60 MIN PROC: Performed by: PHYSICAL MEDICINE & REHABILITATION

## 2022-03-29 PROCEDURE — 2709999900 HC NON-CHARGEABLE SUPPLY: Performed by: PHYSICAL MEDICINE & REHABILITATION

## 2022-03-29 PROCEDURE — 74011250636 HC RX REV CODE- 250/636: Performed by: PHYSICAL MEDICINE & REHABILITATION

## 2022-03-29 PROCEDURE — 74011000250 HC RX REV CODE- 250: Performed by: PHYSICAL MEDICINE & REHABILITATION

## 2022-03-29 RX ORDER — LIDOCAINE HYDROCHLORIDE 10 MG/ML
INJECTION, SOLUTION EPIDURAL; INFILTRATION; INTRACAUDAL; PERINEURAL AS NEEDED
Status: DISCONTINUED | OUTPATIENT
Start: 2022-03-29 | End: 2022-03-29 | Stop reason: HOSPADM

## 2022-03-29 RX ORDER — DIAZEPAM 5 MG/1
5-20 TABLET ORAL ONCE
Status: COMPLETED | OUTPATIENT
Start: 2022-03-29 | End: 2022-03-29

## 2022-03-29 RX ORDER — DEXAMETHASONE SODIUM PHOSPHATE 100 MG/10ML
INJECTION INTRAMUSCULAR; INTRAVENOUS AS NEEDED
Status: DISCONTINUED | OUTPATIENT
Start: 2022-03-29 | End: 2022-03-29 | Stop reason: HOSPADM

## 2022-03-29 RX ORDER — LIDOCAINE HYDROCHLORIDE 20 MG/ML
INJECTION, SOLUTION EPIDURAL; INFILTRATION; INTRACAUDAL; PERINEURAL AS NEEDED
Status: DISCONTINUED | OUTPATIENT
Start: 2022-03-29 | End: 2022-03-29 | Stop reason: HOSPADM

## 2022-03-29 RX ADMIN — DIAZEPAM 10 MG: 5 TABLET ORAL at 08:26

## 2022-03-29 NOTE — INTERVAL H&P NOTE
Update History & Physical    The Patient's History and Physical of March 14, 2022 was reviewed. There was no change. The surgical site was confirmed by the patient and me. Plan:  The risk, benefits, expected outcome, and alternative to the recommended procedure have been discussed with the patient. Patient understands and wants to proceed with the procedure.     Electronically signed by Cara Houser MD on 3/29/2022 at 8:42 AM

## 2022-03-29 NOTE — PROCEDURES
[facility]    LUMBAR RADIOFREQUENCY THERMOCOAGULATION   PROCEDURE REPORT      PATIENT:  Erik Bigger OF BIRTH:  1940  DATE OF SERVICE:  3/29/2022  [place]    PRE-PROCEDURE DIAGNOSIS:  Lumbar Facet Arthropathy, Lumbar Spondylosis  POST-PROCEDURE DIAGNOSIS:  Same  PROCEDURE: right radiofrequency thermocoagulation of lumbar medial branch nerves at L3/L4,  L4/L5 and the L5 dorsal ramus  for treatment of L4/5 and L5/S1  presumed lumbar facet joint mediated pain using the [employer] Coolief system    ANESTHESIA:   Local with or with out oral sedation. See Medication Administration Record for specific medications and dosage. PHYSICIAN:  Carmelita Keenan MD    PRE-PROCEDURE NOTE:  Pre-procedural assessment of the patient was performed. The patient has had greater than 50% improvement of pain score and functional abilities with medial branch blocks and is considered an appropriate candidate for RFA. A full description of the procedure was provided including the risks, benefits, possible complications, and alternative options. Informed consent was given and signed. The availability of a responsible adult to escort the patient following the procedure was confirmed. PROCEDURE NOTE:  The patient was brought to the fluoroscopy suite and positioned on the fluoroscopy table in the prone position. A grounding pad was applied to the lower extremity. Physiologic monitors were applied. The lumbar skin  was widely prepped, allowed to air dry, and draped in standard sterile surgical fashion. 1% Lidocaine was utilized via 25g needle for local anesthesia Please refer to the Flowsheet for documentation of the patients medications and vital signs. .    Under ipsilateral oblique fluoroscopic guidance a  17gauge 75mm radiofrequency introducer needle was placed and slowly advanced.  The planned anatomic targets were approached in a perpendicular fashion to  the junction of the superior articular processes and the transverse processes of  right L4 and L5 . For the L5 dorsal ramus, the needle was placed at  the S1 superior articular process at the base of the sacral ala. Needle tip position was verified with additional lateral and AP views. After each individual needle was placed and stylets removed, a radiofrequency probe with a 4mm active tip was inserted. At each site,  motor testing at 2 Hz to a maximum of 2 volts was performed. The patient was awake and responsive during this portion of the procedure. Patient denied any complaints into the buttocks/leg. There was no evidence of motor stimulation in the ipsilateral gluteal muscles or extremity. After the negative aspiration of blood, air or CSF, each target was then anesthetized with 1-2 mL of lidocaine 2% . Each target was lesioned at 80 degrees Celsius for a total cycle of 2 minutes and 30 seconds. Tissue impedence remained below 500 Ohms. A mixture of 2mL lidocaine 1%  with dexamethasone 10mg [10mg/ml] was then injected through each radiofrequency needle . All needles and electrodes were removed intact. The area was thoroughly cleaned and sterile bandages applied as necessary. Fluoroscopic images were digitally archived. The patient tolerated the procedure well without complication and the vital signs remained stable throughout the procedure. POST-PROCEDURE COURSE :  The patient was escorted from the procedure suite in satisfactory condition. The patient did not experience any adverse events and remained hemodynamically stable during the post-procedure period. Patient was observed for at least 10 minutes post-procedure. No increase in back or leg symptoms. Dressing clean, dry, and intact. LE strength intact. Appropriate post-procedure instructions were provided  The patient is aware that their pain may flare and that 4-6 weeks may be required prior to the onset of pain relief. Patient reported essence-procedural pain on Visual Analog Scale:  pre-8; post-0.                   Katie Puentes MD 3/29/2022 8:44 AM

## 2022-03-29 NOTE — PERIOP NOTES
Patient verbally consents to HIPAA and verbalizes understanding of discharge instructions. Signature pad not working.

## 2022-04-19 ENCOUNTER — APPOINTMENT (OUTPATIENT)
Dept: GENERAL RADIOLOGY | Age: 82
End: 2022-04-19
Attending: PHYSICAL MEDICINE & REHABILITATION
Payer: MEDICARE

## 2022-04-19 ENCOUNTER — HOSPITAL ENCOUNTER (OUTPATIENT)
Age: 82
Setting detail: OUTPATIENT SURGERY
Discharge: HOME | End: 2022-04-19
Attending: PHYSICAL MEDICINE & REHABILITATION | Admitting: PHYSICAL MEDICINE & REHABILITATION
Payer: MEDICARE

## 2022-04-19 VITALS
TEMPERATURE: 98 F | OXYGEN SATURATION: 100 % | RESPIRATION RATE: 16 BRPM | HEART RATE: 74 BPM | SYSTOLIC BLOOD PRESSURE: 138 MMHG | DIASTOLIC BLOOD PRESSURE: 79 MMHG

## 2022-04-19 PROCEDURE — 2709999900 HC NON-CHARGEABLE SUPPLY: Performed by: PHYSICAL MEDICINE & REHABILITATION

## 2022-04-19 PROCEDURE — 64636 DESTROY L/S FACET JNT ADDL: CPT | Performed by: PHYSICAL MEDICINE & REHABILITATION

## 2022-04-19 PROCEDURE — 76010000009 HC PAIN MGT 0 TO 30 MIN PROC: Performed by: PHYSICAL MEDICINE & REHABILITATION

## 2022-04-19 PROCEDURE — 74011250636 HC RX REV CODE- 250/636: Performed by: PHYSICAL MEDICINE & REHABILITATION

## 2022-04-19 PROCEDURE — 74011250637 HC RX REV CODE- 250/637: Performed by: PHYSICAL MEDICINE & REHABILITATION

## 2022-04-19 PROCEDURE — 74011000250 HC RX REV CODE- 250: Performed by: PHYSICAL MEDICINE & REHABILITATION

## 2022-04-19 PROCEDURE — 64635 DESTROY LUMB/SAC FACET JNT: CPT | Performed by: PHYSICAL MEDICINE & REHABILITATION

## 2022-04-19 RX ORDER — DIAZEPAM 5 MG/1
5-20 TABLET ORAL ONCE
Status: COMPLETED | OUTPATIENT
Start: 2022-04-19 | End: 2022-04-19

## 2022-04-19 RX ORDER — LIDOCAINE HYDROCHLORIDE 20 MG/ML
INJECTION, SOLUTION EPIDURAL; INFILTRATION; INTRACAUDAL; PERINEURAL AS NEEDED
Status: DISCONTINUED | OUTPATIENT
Start: 2022-04-19 | End: 2022-04-19 | Stop reason: HOSPADM

## 2022-04-19 RX ORDER — LIDOCAINE HYDROCHLORIDE 10 MG/ML
INJECTION, SOLUTION EPIDURAL; INFILTRATION; INTRACAUDAL; PERINEURAL AS NEEDED
Status: DISCONTINUED | OUTPATIENT
Start: 2022-04-19 | End: 2022-04-19 | Stop reason: HOSPADM

## 2022-04-19 RX ORDER — DEXAMETHASONE SODIUM PHOSPHATE 100 MG/10ML
INJECTION INTRAMUSCULAR; INTRAVENOUS AS NEEDED
Status: DISCONTINUED | OUTPATIENT
Start: 2022-04-19 | End: 2022-04-19 | Stop reason: HOSPADM

## 2022-04-19 RX ADMIN — DIAZEPAM 10 MG: 5 TABLET ORAL at 07:30

## 2022-04-19 NOTE — PROCEDURES
[facility]    LUMBAR RADIOFREQUENCY THERMOCOAGULATION   PROCEDURE REPORT      PATIENT:  Broderick iJmenez OF BIRTH:  1940  DATE OF SERVICE:  4/19/2022  [place]    PRE-PROCEDURE DIAGNOSIS:  Lumbar Facet Arthropathy, Lumbar Spondylosis  POST-PROCEDURE DIAGNOSIS:  Same  PROCEDURE: left radiofrequency thermocoagulation of lumbar medial branch nerves at L3/L4,  L4/L5 and the L5 dorsal ramus  for treatment of L4/5 and L5/S1  presumed lumbar facet joint mediated pain using the [employer] Coolief system    ANESTHESIA:   Local with or with out oral sedation. See Medication Administration Record for specific medications and dosage. PHYSICIAN:  Carmelita Narvaez MD    PRE-PROCEDURE NOTE:  Pre-procedural assessment of the patient was performed. The patient has had greater than 50% improvement of pain score and functional abilities with medial branch blocks and is considered an appropriate candidate for RFA. A full description of the procedure was provided including the risks, benefits, possible complications, and alternative options. Informed consent was given and signed. The availability of a responsible adult to escort the patient following the procedure was confirmed. PROCEDURE NOTE:  The patient was brought to the fluoroscopy suite and positioned on the fluoroscopy table in the prone position. A grounding pad was applied to the lower extremity. Physiologic monitors were applied. The lumbar skin  was widely prepped, allowed to air dry, and draped in standard sterile surgical fashion. 1% Lidocaine was utilized via 25g needle for local anesthesia Please refer to the Flowsheet for documentation of the patients medications and vital signs. .    Under ipsilateral oblique fluoroscopic guidance a  17gauge 75mm radiofrequency introducer needle was placed and slowly advanced.  The planned anatomic targets were approached in a perpendicular fashion to  the junction of the superior articular processes and the transverse processes of  left L4 and L5 . For the L5 dorsal ramus, the needle was placed at  the S1 superior articular process at the base of the sacral ala. Needle tip position was verified with additional lateral and AP views. After each individual needle was placed and stylets removed, a radiofrequency probe with a 4mm active tip was inserted. At each site,  motor testing at 2 Hz to a maximum of 2 volts was performed. The patient was awake and responsive during this portion of the procedure. Patient denied any complaints into the buttocks/leg. There was no evidence of motor stimulation in the ipsilateral gluteal muscles or extremity. After the negative aspiration of blood, air or CSF, each target was then anesthetized with 1-2 mL of lidocaine 2% . Each target was lesioned at 80 degrees Celsius for a total cycle of 2 minutes and 30 seconds. Tissue impedence remained below 500 Ohms. A mixture of 2mL lidocaine 1%  with dexamethasone 10mg [10mg/ml] was then injected through each radiofrequency needle . All needles and electrodes were removed intact. The area was thoroughly cleaned and sterile bandages applied as necessary. Fluoroscopic images were digitally archived. The patient tolerated the procedure well without complication and the vital signs remained stable throughout the procedure. POST-PROCEDURE COURSE :  The patient was escorted from the procedure suite in satisfactory condition. The patient did not experience any adverse events and remained hemodynamically stable during the post-procedure period. Patient was observed for at least 10 minutes post-procedure. No increase in back or leg symptoms. Dressing clean, dry, and intact. LE strength intact. Appropriate post-procedure instructions were provided  The patient is aware that their pain may flare and that 4-6 weeks may be required prior to the onset of pain relief. Patient reported essence-procedural pain on Visual Analog Scale:  pre-8; post-8. Spoke w/daughter who was concerned about worsening leg pain in the afternoons. Will start APAP 650 at lunch. TRUDI in past, helped for a few weeks only. Cares for debilitated spouse.                   Calos Gar MD 4/19/2022 2:03 PM

## 2022-04-19 NOTE — H&P
Admission  Discharged     3/29/2022  [facility]   Lumbar pain      Dx       Procedures  Mary Cobb MD (Physician) [facility]     [address]     LUMBAR RADIOFREQUENCY THERMOCOAGULATION   PROCEDURE REPORT        PATIENT:  Bertha Lee OF BIRTH:  1940  DATE OF SERVICE:  3/29/2022  SITE:  [place], [place]     PRE-PROCEDURE DIAGNOSIS:  Lumbar Facet Arthropathy, Lumbar Spondylosis  POST-PROCEDURE DIAGNOSIS:  Same  PROCEDURE: right radiofrequency thermocoagulation of lumbar medial branch nerves at L3/L4,  L4/L5 and the L5 dorsal ramus  for treatment of L4/5 and L5/S1  presumed lumbar facet joint mediated pain using the [employer] Coolief system     ANESTHESIA:   Local with or with out oral sedation. See Medication Administration Record for specific medications and dosage.     PHYSICIAN:  Mee Harvey MD     PRE-PROCEDURE NOTE:  Pre-procedural assessment of the patient was performed. The patient has had greater than 50% improvement of pain score and functional abilities with medial branch blocks and is considered an appropriate candidate for RFA. A full description of the procedure was provided including the risks, benefits, possible complications, and alternative options. Informed consent was given and signed. The availability of a responsible adult to escort the patient following the procedure was confirmed.     PROCEDURE NOTE:  The patient was brought to the fluoroscopy suite and positioned on the fluoroscopy table in the prone position. A grounding pad was applied to the lower extremity. Physiologic monitors were applied. The lumbar skin  was widely prepped, allowed to air dry, and draped in standard sterile surgical fashion. 1% Lidocaine was utilized via 25g needle for local anesthesia Please refer to the Flowsheet for documentation of the patients medications and vital signs. .     Under ipsilateral oblique fluoroscopic guidance a  17gauge 75mm radiofrequency introducer needle was placed and slowly advanced. The planned anatomic targets were approached in a perpendicular fashion to  the junction of the superior articular processes and the transverse processes of  right L4 and L5 . For the L5 dorsal ramus, the needle was placed at  the S1 superior articular process at the base of the sacral ala. Needle tip position was verified with additional lateral and AP views.      After each individual needle was placed and stylets removed, a radiofrequency probe with a 4mm active tip was inserted. At each site,  motor testing at 2 Hz to a maximum of 2 volts was performed. The patient was awake and responsive during this portion of the procedure. Patient denied any complaints into the buttocks/leg. There was no evidence of motor stimulation in the ipsilateral gluteal muscles or extremity.         After the negative aspiration of blood, air or CSF, each target was then anesthetized with 1-2 mL of lidocaine 2% . Each target was lesioned at 80 degrees Celsius for a total cycle of 2 minutes and 30 seconds. Tissue impedence remained below 500 Ohms. A mixture of 2mL lidocaine 1%  with dexamethasone 10mg [10mg/ml] was then injected through each radiofrequency needle . All needles and electrodes were removed intact. The area was thoroughly cleaned and sterile bandages applied as necessary. Fluoroscopic images were digitally archived. The patient tolerated the procedure well without complication and the vital signs remained stable throughout the procedure.     POST-PROCEDURE COURSE :  The patient was escorted from the procedure suite in satisfactory condition. The patient did not experience any adverse events and remained hemodynamically stable during the post-procedure period. Patient was observed for at least 10 minutes post-procedure. No increase in back or leg symptoms. Dressing clean, dry, and intact. LE strength intact. Appropriate post-procedure instructions were provided  The patient is aware that their pain may flare and that 4-6 weeks may be required prior to the onset of pain relief. Patient reported esesnce-procedural pain on Visual Analog Scale:  pre-8; post-0.                                                                                                                                                       Lizzeth Tilley MD 3/29/2022 8:44 AM          Interval H&P Note  Compa Chaves MD (Physician) [facility]                                      Update History & Physical     The Patient's History and Physical of 2022 was reviewed. There was no change. The surgical site was confirmed by the patient and me.     Plan:  The risk, benefits, expected outcome, and alternative to the recommended procedure have been discussed with the patient. Patient understands and wants to proceed with the procedure.     Electronically signed by Lizzeth Tilley MD on 3/29/2022 at 8:42 AM          Source Note                                                                                            H&P [facility])  Compa Chaves MD (Physician) Diana Whitt Physical Medicine and [address] [place] [address]. [facility], [address] [address],[address]  [address]  Phone: [phone]  Fax: [phone]           Odom Ke  : 1940  PCP: Matthew Fields MD     PROGRESS NOTE        ASSESSMENT AND PLAN     Diagnoses and all orders for this visit:     1. Lumbar spondylosis  -     SCHEDULE SURGERY     2. Spinal stenosis of lumbar region without neurogenic claudication  -     SCHEDULE SURGERY     3. Bilateral calf pain  -     methocarbamoL (ROBAXIN) 500 mg tablet; Take 0.5-1 Tablets by mouth two (2) times daily as needed for Muscle Spasm(s) or Pain.           1Mine Contreras is a 78 y.o. female with severe stenosis but primarily with mechanical low back pain.  She has had a good response to medial branch blocks last year when she lived in [place]. She would like to proceed with RF. 2. DC Lyrica  3. Trial of Baclofen 5-10 mg TID PRN  4. Continue Lidoderm patches  5. Risks, benefits, alternatives, and limitations of RFA discussed with patient. Patient wishes to proceed. 6. Schedule RFA B/L L4-L5, B/L L5-S1. Right side first     Follow-up and Dispositions  ·   Return for after Injections.              HISTORY OF PRESENT ILLNESS        Kim Tompkins is a 78 y.o. female presents for follow up of back pain. LV trial of Lyrica 50 mg BID.     She reports pain radiating into her buttocks and thighs. Her pain is exacerbated with standing. She also continues to have pain in both calves. She states that her calf pain is worse in the afternoon and night. She has difficulty sleeping due to this pain. Denies sciatica, buttock, or thigh radiations from back     She is taking Lyrica 50 mg BID with no benefit. The daytime dose promoted extreme somnolence. Patient takes Hydrocodone PRN, Aspirin PRN, and Lidoderm patches with benefit. Denies side effects.     Pain Assessment  3/14/2022   Location of Pain Back;Leg   Location Modifiers Left;Right   Severity of Pain 0   Quality of Pain Sharp; Throbbing   Quality of Pain Comment -   Duration of Pain Persistent   Frequency of Pain Intermittent   Aggravating Factors Other (Comment)   Aggravating Factors Comment standing a lot in the afternoon.  sitting too long   Limiting Behavior Yes   Relieving Factors Nothing   Result of Injury No            Onset of pain: 2020, no injury        Investigations:   L MRI 7/2021: grade 1-2 listhesis L4-5 with moderate to severe stenosis, multilevel severe DDD, scoliosis  Spine surgery consult: by Dr. Andres Lee,, discussed multilevel fusion L2-S1, possible vertebral augmentation due to osteoporosis.  Recommended conservative management.     Treatments:  Physical therapy: a little  Spinal injections: Bilateral L5 12/2021 Dr. Meghna Acevedo with benefit .10/2021 bilateral PSIS injection Dr. Olvin Olsen with benefit . MBB x2 L2L5 8/2021 [facility] benefit [facility]  Spinal surgery- no  Beneficial medications: Hydrocodone, Lidoderm patches, aspirin   Failed medications: Lyrica (somnolence), IcyHot,      Work Status: retired, lives in [place], [place] [address] in [place] (previous full-time [place] resident),  has medical issues, requires assistance.   Pertinent PMHx:  HTN, anxiety, GERD, rotator cuff tear.      PHYSICAL EXAMINATION     Visit Vitals       Pulse 77   Temp 97.5 °F (36.4 °C) (Temporal)   Ht 5' (1.524 m)   Wt 115 lb (52.2 kg)   SpO2 99% Comment: RA   BMI 22.46 kg/m²      Elderly kyphotic lady in no acute distress   LE strength intact  SLR negative  No edema  No calf tenderness  TTP L4-5, L5-S1  Increased pain with extension                       Written by Nelly Rutledge, as dictated by Aline Stovall MD.             Other Notes  All notes       Periop Notes from Kristal Calixto RN         Periop Notes from Kristal Calixto RN         Periop Notes from Kristal Calixto RN         Periop Notes from Miguelito High RN (NURSING)        Additional Documentation    Vitals:  /88 Important   (BP 1 Location: Left upper arm, BP Patient Position: Prone)     Pulse 77     Temp 98 °F (36.7 °C)     Resp 16     SpO2 99%            More Vitals     Flowsheets:  C-SSRS Suicide Screening,     Pain Vitals,     Vitals Reassessment,     Pre-Procedure Checklist,     DISCHARGE CHECKLIST,     Procedure Verification        Encounter Info:  Billing Info,     History,     Allergies,     Detailed Report          Media  From this encounter  Scan on 3/30/2022 1505 by Devyn Argueta: Orders   Scan on 3/30/2022 1505 by Devyn Argueta: Consents/Legal       Orders Placed         NC XR TECHNOLOGIST SERVICE ONE TIME        Medication List at Discharge         atorvastatin calcium 40 mg Oral DAILY         calcium carbonate/vitamin D3 600-125 mg-unit 1 Tablet Oral 2 TIMES DAILY         escitalopram oxalate 5 mg Oral DAILY         lidocaine 5 % APPLY 1 TO 3 PATCHES TOPICALLY TO THE SKIN EVERY DAY. MAY WEAR UP TO 12 HOURS.  REMOVE PATCHES FOR 12 HOURS         losartan potassium 50 mg Oral DAILY         methocarbamol 250-500 mg Oral 2 TIMES DAILY AS NEEDED         pantoprazole sodium 40 mg Oral DAILY         vit C/vit E ac/lut/copper/zinc 1 Tablet Oral DAILY        Medications Administered     diazepam 10 mg      Visit Diagnoses         Lumbar pain       Problem List

## 2022-04-19 NOTE — INTERVAL H&P NOTE
Update History & Physical    The Patient's History and Physical of March 29, 2022 was reviewed. There was no change. The surgical site was confirmed by the patient and me. Plan:  The risk, benefits, expected outcome, and alternative to the recommended procedure have been discussed with the patient. Patient understands and wants to proceed with the procedure.     Electronically signed by Lizette Grimaldo MD on 4/19/2022 at 8:07 AM

## 2022-05-16 ENCOUNTER — VIRTUAL VISIT (OUTPATIENT)
Dept: ORTHOPEDIC SURGERY | Age: 82
End: 2022-05-16
Payer: MEDICARE

## 2022-05-16 DIAGNOSIS — M47.816 LUMBAR SPONDYLOSIS: Primary | ICD-10-CM

## 2022-05-16 DIAGNOSIS — M79.661 BILATERAL CALF PAIN: ICD-10-CM

## 2022-05-16 DIAGNOSIS — M79.662 BILATERAL CALF PAIN: ICD-10-CM

## 2022-05-16 DIAGNOSIS — M48.061 SPINAL STENOSIS OF LUMBAR REGION WITHOUT NEUROGENIC CLAUDICATION: ICD-10-CM

## 2022-05-16 PROCEDURE — 99442 PR PHYS/QHP TELEPHONE EVALUATION 11-20 MIN: CPT | Performed by: PHYSICAL MEDICINE & REHABILITATION

## 2022-05-16 NOTE — PROGRESS NOTES
Daniel Hahn presents today for   Chief Complaint   Patient presents with    Back Pain       Is someone accompanying this pt? no    Is the patient using any DME equipment during OV? no    Depression Screening:  3 most recent PHQ Screens 9/16/2019   Little interest or pleasure in doing things Not at all   Feeling down, depressed, irritable, or hopeless Not at all   Total Score PHQ 2 0       Learning Assessment:  Learning Assessment 3/14/2022   PRIMARY LEARNER Patient   BARRIERS PRIMARY LEARNER HEARING   PRIMARY LANGUAGE ENGLISH   LEARNER PREFERENCE PRIMARY PICTURES   ANSWERED BY patient   RELATIONSHIP SELF       Abuse Screening:  Abuse Screening Questionnaire 3/14/2022   Do you ever feel afraid of your partner? N   Are you in a relationship with someone who physically or mentally threatens you? N   Is it safe for you to go home? Y       Fall Risk  Fall Risk Assessment, last 12 mths 3/14/2022   Able to walk? Yes   Fall in past 12 months? 0   Do you feel unsteady? 0   Are you worried about falling 0       Coordination of Care:  1. Have you been to the ER, urgent care clinic since your last visit? no  Hospitalized since your last visit? no    2. Have you seen or consulted any other health care providers outside of the [address] [place] [address] since your last visit? no Include any pap smears or colon screening.  no

## 2022-05-16 NOTE — PROGRESS NOTES
Cody Scales [facility] 2.  [address], [address]  [facility], [address]  Phone: [phone]  Fax: [phone]      Stefanie Contreras is a 78 y.o. female evaluated via patient initiated telephone call on 5/16/2022. Diagnoses and all orders for this visit:    1. Lumbar spondylosis    2. Bilateral calf pain    3. Spinal stenosis of lumbar region without neurogenic claudication         1. 81yo w/improved LBP post facet RFA. 2. Advised to continue HEP as tolerated. 3. DC Robaxin, no benefit for calf pain. 4. Continue Lidoderm patches, future RFs through PCP. Follow-up and Dispositions    · Return if symptoms worsen or fail to improve. HISTORY OF PRESENT ILLNESS  Stefanie Contreras is a 78 y.o. female. Pt presents to the office for a f/u visit for back pain/RFA. Patient received RFA 4/2022 with 75% benefit. Denies side effects. She reports that her pain has improved. Her main pain is in her calves and occasionally in her feet. Denies N/T. Reports dull pain in calves. Denies insomnia. Patient uses a Lidoderm patch 1-2 times a week with benefit. Denies side effects. She also uses Icy Hot PRN for her calves which helps some. She states that Robaxin is ineffective.  She has not been compliant with her HEP    Pain Assessment  5/16/2022   Location of Pain Back   Location Modifiers -   Severity of Pain 0   Quality of Pain Dull;Aching   Quality of Pain Comment -   Duration of Pain A few hours   Frequency of Pain Several days a week   Aggravating Factors (No Data)   Aggravating Factors Comment nothing in particular   Limiting Behavior Yes   Relieving Factors (No Data)   Relieving Factors Comment patches and aspirin   Result of Injury -       Onset of pain: 2020, no injury        Investigations:   L MRI 7/2021: grade 1-2 listhesis L4-5 with moderate to severe stenosis, multilevel severe DDD, scoliosis  Spine surgery consult: by Dr. Lalo Mitchell,, discussed multilevel fusion L2-S1, possible vertebral augmentation due to osteoporosis.  Recommended conservative management.     Treatments:  Physical therapy: a little  Spinal injections: RFA 4/2022 with 75% benefit. Bilateral L5 12/2021 Dr. Amena Alarcon with benefit .10/2021 bilateral PSIS injection Dr. Talia Monteiro with benefit . MBB x2 L2L5 8/2021 with benefit (Dr. Anil Guillen, [facility] pain management in [address] [place] [facility]  Spinal surgery- no  Beneficial medications: Hydrocodone, Lidoderm patches, aspirin   Failed medications: Lyrica (somnolence), IcyHot, Robaxin, Baclofen     Work Status: retired, lives in [address] [place] [address] (previous full-time [place] resident),  has medical issues, requires assistance. Pertinent PMHx:  HTN, anxiety, GERD, rotator cuff tear. Daughter retired from [employer]. Consent:  She and/or health care decision maker is aware that that she may receive a bill for this telephone service, depending on her insurance coverage, and has provided verbal consent to proceed: Yes    I affirm this is a Patient Initiated Episode with an Established Patient who has not had a related appointment within my department in the past 7 days or scheduled within the next 24 hours. Total Time: minutes: 11-20 minutes    Note: not billable if this call serves to triage the patient into an appointment for the relevant concern    Portions of this document were created using Dragon voice recognition software. Unintended errors tomas be present.   Renee Nicole MD       Written by Naun Gonzalez, as dictated by Andrew Lora MD.

## 2022-05-16 NOTE — PATIENT INSTRUCTIONS

## 2022-10-10 ENCOUNTER — OFFICE VISIT (OUTPATIENT)
Dept: INTERNAL MEDICINE CLINIC | Age: 82
End: 2022-10-10
Payer: MEDICARE

## 2022-10-10 VITALS
RESPIRATION RATE: 18 BRPM | OXYGEN SATURATION: 98 % | DIASTOLIC BLOOD PRESSURE: 69 MMHG | SYSTOLIC BLOOD PRESSURE: 118 MMHG | BODY MASS INDEX: 21.99 KG/M2 | HEART RATE: 82 BPM | WEIGHT: 112 LBS | TEMPERATURE: 97 F | HEIGHT: 60 IN

## 2022-10-10 DIAGNOSIS — M54.50 CHRONIC LOW BACK PAIN WITHOUT SCIATICA, UNSPECIFIED BACK PAIN LATERALITY: ICD-10-CM

## 2022-10-10 DIAGNOSIS — Z23 NEEDS FLU SHOT: ICD-10-CM

## 2022-10-10 DIAGNOSIS — E78.00 PURE HYPERCHOLESTEROLEMIA: ICD-10-CM

## 2022-10-10 DIAGNOSIS — I10 ESSENTIAL HYPERTENSION: ICD-10-CM

## 2022-10-10 DIAGNOSIS — Z76.89 ESTABLISHING CARE WITH NEW DOCTOR, ENCOUNTER FOR: Primary | ICD-10-CM

## 2022-10-10 DIAGNOSIS — G89.29 CHRONIC LOW BACK PAIN WITHOUT SCIATICA, UNSPECIFIED BACK PAIN LATERALITY: ICD-10-CM

## 2022-10-10 DIAGNOSIS — F41.9 ANXIETY: ICD-10-CM

## 2022-10-10 PROCEDURE — G8427 DOCREV CUR MEDS BY ELIG CLIN: HCPCS | Performed by: INTERNAL MEDICINE

## 2022-10-10 PROCEDURE — 1123F ACP DISCUSS/DSCN MKR DOCD: CPT | Performed by: INTERNAL MEDICINE

## 2022-10-10 PROCEDURE — G8536 NO DOC ELDER MAL SCRN: HCPCS | Performed by: INTERNAL MEDICINE

## 2022-10-10 PROCEDURE — G8400 PT W/DXA NO RESULTS DOC: HCPCS | Performed by: INTERNAL MEDICINE

## 2022-10-10 PROCEDURE — G0463 HOSPITAL OUTPT CLINIC VISIT: HCPCS | Performed by: INTERNAL MEDICINE

## 2022-10-10 PROCEDURE — G8420 CALC BMI NORM PARAMETERS: HCPCS | Performed by: INTERNAL MEDICINE

## 2022-10-10 PROCEDURE — 1090F PRES/ABSN URINE INCON ASSESS: CPT | Performed by: INTERNAL MEDICINE

## 2022-10-10 PROCEDURE — G8510 SCR DEP NEG, NO PLAN REQD: HCPCS | Performed by: INTERNAL MEDICINE

## 2022-10-10 PROCEDURE — 99204 OFFICE O/P NEW MOD 45 MIN: CPT | Performed by: INTERNAL MEDICINE

## 2022-10-10 PROCEDURE — 90694 VACC AIIV4 NO PRSRV 0.5ML IM: CPT | Performed by: INTERNAL MEDICINE

## 2022-10-10 PROCEDURE — 1101F PT FALLS ASSESS-DOCD LE1/YR: CPT | Performed by: INTERNAL MEDICINE

## 2022-10-10 PROCEDURE — G0444 DEPRESSION SCREEN ANNUAL: HCPCS | Performed by: INTERNAL MEDICINE

## 2022-10-10 RX ORDER — MELOXICAM 7.5 MG/1
TABLET ORAL
Qty: 90 TABLET | Refills: 0 | Status: SHIPPED | OUTPATIENT
Start: 2022-10-10

## 2022-10-10 RX ORDER — LORAZEPAM 0.5 MG/1
0.5 TABLET ORAL
Qty: 30 TABLET | Refills: 0 | Status: SHIPPED | OUTPATIENT
Start: 2022-10-10

## 2022-10-10 RX ORDER — ESCITALOPRAM OXALATE 10 MG/1
10 TABLET ORAL DAILY
Qty: 90 TABLET | Refills: 0 | Status: SHIPPED | OUTPATIENT
Start: 2022-10-10

## 2022-10-10 NOTE — PROGRESS NOTES
Cara Rodríguez presents today for   Chief Complaint   Patient presents with    New Patient       Is someone accompanying this pt? Yes daughter and     Is the patient using any DME equipment during [address]? no    Depression Screening:  3 most recent PHQ Screens 10/10/2022   Little interest or pleasure in doing things Not at all   Feeling down, depressed, irritable, or hopeless Not at all   Total Score PHQ 2 0       Learning Assessment:  Learning Assessment 3/14/2022   PRIMARY LEARNER Patient   BARRIERS PRIMARY LEARNER HEARING   PRIMARY LANGUAGE ENGLISH   LEARNER PREFERENCE PRIMARY PICTURES   ANSWERED BY patient   RELATIONSHIP SELF       Abuse Screening:  Abuse Screening Questionnaire 10/10/2022   Do you ever feel afraid of your partner? N   Are you in a relationship with someone who physically or mentally threatens you? N   Is it safe for you to go home? Y       Fall Risk  Fall Risk Assessment, last 12 mths 10/10/2022   Able to walk? Yes   Fall in past 12 months? 0   Do you feel unsteady? 0   Are you worried about falling 0       ADL  No flowsheet data found. Health Maintenance reviewed and discussed and ordered per Provider. Health Maintenance Due   Topic Date Due    Depression Screen  Never done    COVID-19 Vaccine (1) Never done    Bone Densitometry (Dexa) Screening  Never done    Medicare Yearly Exam  Never done    Shingrix Vaccine Age 50> (2 of 2) 09/30/2020    Flu Vaccine (1) 08/01/2022   . Coordination of Care:  1. Have you been to the ER, urgent care clinic since your last visit? Hospitalized since your last visit? no    2. Have you seen or consulted any other health care providers outside of the [address] [place] [address] since your last visit? Include any pap smears or colon screening. no    3. For patients aged 39-70: Has the patient had a colonoscopy? NA - based on age     If the patient is female:    4. For patients aged 41-77: Has the patient had a mammogram within the past 2 years?  NA - based on age    11. For patients aged 21-65: Has the patient had a pap smear?  NA - based on age

## 2022-10-11 NOTE — PROGRESS NOTES
[facility]     Asaf Fraser is here to establish new primary care. Her chief issues are longstanding low back pain, which persists despite procedures including a radiofrequency ablation that did not give her longstanding pain relief. She also has episodes of intense anxiety. Her  and her daughter are with her, and her daughter is requesting Xanax and Vicodin for her mother, says these are the only things that have worked in the past.  She also wonders about a medication called meloxicam, which I told her I could prescribe for her mother as long as she takes it with food, continues her daily proton pump inhibitor, and that we get follow-up labs including a creatinine. Her last GFR in December 2021 was 64, and I discussed that this is fairly good considering age and comorbidity of hypertension. She has occasionally taken Aleve in the past, and I told her not to take any of this once we start the meloxicam.  Her daughter describes episodes where she is extremely anxious and difficult to calm down. I suggested we start a low-dose anxiolytic that will not cause excessive drowsiness and without potential for habituation. I am willing to give her a low-dose of a different benzodiazepine, with the understanding that this is to be used until the Lexapro takes effect in 1 to 3 weeks. Past Medical History:   Diagnosis Date    Anxiety     Hyperlipidemia     Hypertension         Past Surgical History:   Procedure Laterality Date    HX HYSTERECTOMY N/A         Current Outpatient Medications   Medication Sig Dispense Refill    escitalopram oxalate (LEXAPRO) 10 mg tablet Take 1 Tablet by mouth daily. 90 Tablet 0    LORazepam (ATIVAN) 0.5 mg tablet Take 1 Tablet by mouth two (2) times daily as needed for Anxiety. Max Daily Amount: 1 mg. 30 Tablet 0    meloxicam (MOBIC) 7.5 mg tablet 1 p.o. daily as needed pain. Take all doses with food. Continue Protonix.  90 Tablet 0    lidocaine (LIDODERM) 5 % APPLY 1 TO 3 PATCHES TOPICALLY TO THE SKIN EVERY DAY. MAY WEAR UP TO 12 HOURS. REMOVE PATCHES FOR 12 HOURS 90 Each 1    pantoprazole (PROTONIX) 40 mg tablet Take 20 mg by mouth daily.  losartan (COZAAR) 50 mg tablet Take 50 mg by mouth daily.  atorvastatin (LIPITOR) 40 mg tablet Take 40 mg by mouth daily.  calcium-cholecalciferol, d3, 600-125 mg-unit tab Take 1 Tablet by mouth two (2) times a day.  vit C/vit E ac/lut/copper/zinc (PRESERVISION LUTEIN PO) Take 1 Tablet by mouth daily. No Known Allergies     Social History     Socioeconomic History    Marital status:      Spouse name: Not on file    Number of children: Not on file    Years of education: Not on file    Highest education level: Not on file   Occupational History    Not on file   Tobacco Use    Smoking status: Never    Smokeless tobacco: Never   Substance and Sexual Activity    Alcohol use: Not on file     Comment: 2/week    Drug use: Not on file    Sexual activity: Not on file   Other Topics Concern    Not on file   Social History Narrative    Not on file     Social Determinants of Health     Financial Resource Strain: Not on file   Food Insecurity: Not on file   Transportation Needs: Not on file   Physical Activity: Not on file   Stress: Not on file   Social Connections: Not on file   Intimate Partner Violence: Not on file   Housing Stability: Not on file        Family History   Problem Relation Age of Onset    Hypertension Mother     Stroke Mother     Hypertension Father     Heart Attack Father     Dementia Sister     Stroke Brother     High Cholesterol Daughter     Hypertension Daughter            Visit Vitals  /69   Pulse 82   Temp 97 °F (36.1 °C) (Temporal)   Resp 18   Ht 5' (1.524 m)   Wt 112 lb (50.8 kg)   SpO2 98%   BMI 21.87 kg/m²        Review of Systems   Respiratory:  Negative for shortness of breath. Cardiovascular:  Negative for chest pain.    Gastrointestinal:  Negative for blood in stool.   Genitourinary:  Negative for hematuria. Musculoskeletal:  Positive for back pain. Psychiatric/Behavioral:  The patient is nervous/anxious. Physical Exam  Constitutional:       General: She is not in acute distress. Appearance: She is normal weight. Eyes:      General: No scleral icterus. Conjunctiva/sclera: Conjunctivae normal.   Neck:      Comments: Carotid upstrokes normal to palpation. Cardiovascular:      Rate and Rhythm: Normal rate and regular rhythm. Heart sounds: No friction rub. No gallop. Pulmonary:      Effort: Pulmonary effort is normal.      Breath sounds: Normal breath sounds. Abdominal:      Palpations: Abdomen is soft. Tenderness: There is no abdominal tenderness. Musculoskeletal:      Cervical back: Neck supple. Comments: No clubbing, cyanosis, or edema. Skin:     General: Skin is warm and dry. Neurological:      Mental Status: She is alert and oriented to person, place, and time. Psychiatric:         Mood and Affect: Mood normal.                Diagnoses and all orders for this visit:    1. Establishing care with new doctor, encounter for  Comments:  Reviewed EMR prior to patient arrival.  With patient and daughter, reviewed and updated medical, surgical, social, family history. 2. Essential hypertension  Comments:  Controlled, continue medication. Pertinent labs between now and next visit. Orders:  -     METABOLIC PANEL, COMPREHENSIVE; Future  -     URINALYSIS W/MICROSCOPIC; Future    3. Anxiety  Comments:  Advised Xanax highly addictive, would increase risk of sedation or even falls. Start low-dose Lexapro, given limited rx Ativan until SSRI \"kicks in, sedating  Orders:  -     escitalopram oxalate (LEXAPRO) 10 mg tablet; Take 1 Tablet by mouth daily.  -     LORazepam (ATIVAN) 0.5 mg tablet; Take 1 Tablet by mouth two (2) times daily as needed for Anxiety.  Max Daily Amount: 1 mg.    4. Chronic low back pain without sciatica, unspecified back pain laterality  Comments:  Has seen spine specialist for epidurals, radiofrequency ablation. Explained no narcotics with lorazepam.  Mobic as needed with food, PPI, BMP few weeks  Orders:  -     meloxicam (MOBIC) 7.5 mg tablet; 1 p.o. daily as needed pain. Take all doses with food. Continue Protonix. 5. Pure hypercholesterolemia  Comments:  Fasting panel with next labs. Orders:  -     LIPID PANEL;  Future         Follow-up and Dispositions    Return in about 5 weeks (around 11/14/2022) for anxiety-- labs 2 weeks--make appt same day as ';joyce Rowley MD

## 2022-10-25 ENCOUNTER — HOSPITAL ENCOUNTER (OUTPATIENT)
Dept: LAB | Age: 82
Discharge: HOME | End: 2022-10-25
Payer: MEDICARE

## 2022-10-25 ENCOUNTER — APPOINTMENT (OUTPATIENT)
Dept: INTERNAL MEDICINE CLINIC | Age: 82
End: 2022-10-25

## 2022-10-25 DIAGNOSIS — I10 ESSENTIAL HYPERTENSION: ICD-10-CM

## 2022-10-25 DIAGNOSIS — E78.00 PURE HYPERCHOLESTEROLEMIA: ICD-10-CM

## 2022-10-25 LAB
ALBUMIN SERPL-MCNC: 4.3 G/DL (ref 3.4–5)
ALBUMIN/GLOB SERPL: 1.5 {RATIO} (ref 0.8–1.7)
ALP SERPL-CCNC: 72 U/L (ref 45–117)
ALT SERPL-CCNC: 22 U/L (ref 13–56)
ANION GAP SERPL CALC-SCNC: 5 MMOL/L (ref 3–18)
APPEARANCE UR: CLEAR
AST SERPL-CCNC: 21 U/L (ref 10–38)
BILIRUB SERPL-MCNC: 0.6 MG/DL (ref 0.2–1)
BILIRUB UR QL: NEGATIVE
BUN SERPL-MCNC: 23 MG/DL (ref 7–18)
BUN/CREAT SERPL: 24 (ref 12–20)
CALCIUM SERPL-MCNC: 10.1 MG/DL (ref 8.5–10.1)
CHLORIDE SERPL-SCNC: 101 MMOL/L (ref 100–111)
CHOLEST SERPL-MCNC: 264 MG/DL
CO2 SERPL-SCNC: 30 MMOL/L (ref 21–32)
COLOR UR: YELLOW
CREAT SERPL-MCNC: 0.97 MG/DL (ref 0.6–1.3)
GLOBULIN SER CALC-MCNC: 2.9 G/DL (ref 2–4)
GLUCOSE SERPL-MCNC: 86 MG/DL (ref 74–99)
GLUCOSE UR STRIP.AUTO-MCNC: NEGATIVE MG/DL
HDLC SERPL-MCNC: 70 MG/DL (ref 40–60)
HDLC SERPL: 3.8 {RATIO} (ref 0–5)
HGB UR QL STRIP: NEGATIVE
KETONES UR QL STRIP.AUTO: NEGATIVE MG/DL
LDLC SERPL CALC-MCNC: 164 MG/DL (ref 0–100)
LEUKOCYTE ESTERASE UR QL STRIP.AUTO: NEGATIVE
LIPID PROFILE,FLP: ABNORMAL
NITRITE UR QL STRIP.AUTO: NEGATIVE
PH UR STRIP: 7.5 [PH] (ref 5–8)
POTASSIUM SERPL-SCNC: 4.5 MMOL/L (ref 3.5–5.5)
PROT SERPL-MCNC: 7.2 G/DL (ref 6.4–8.2)
PROT UR STRIP-MCNC: NEGATIVE MG/DL
SODIUM SERPL-SCNC: 136 MMOL/L (ref 136–145)
SP GR UR REFRACTOMETRY: 1.01 (ref 1–1.03)
TRIGL SERPL-MCNC: 150 MG/DL (ref ?–150)
UROBILINOGEN UR QL STRIP.AUTO: 0.2 EU/DL (ref 0.2–1)
VLDLC SERPL CALC-MCNC: 30 MG/DL

## 2022-10-25 PROCEDURE — 80053 COMPREHEN METABOLIC PANEL: CPT

## 2022-10-25 PROCEDURE — 36415 COLL VENOUS BLD VENIPUNCTURE: CPT

## 2022-10-25 PROCEDURE — 81003 URINALYSIS AUTO W/O SCOPE: CPT

## 2022-10-25 PROCEDURE — 80061 LIPID PANEL: CPT

## 2022-10-25 RX ORDER — PANTOPRAZOLE SODIUM 20 MG/1
20 TABLET, DELAYED RELEASE ORAL DAILY
Qty: 90 TABLET | Refills: 2 | Status: SHIPPED | OUTPATIENT
Start: 2022-10-25

## 2022-10-25 NOTE — TELEPHONE ENCOUNTER
Last Visit: 10/10/22 with MD Ayaan Patino  Next Appointment: 11/14/22 with MD Ayaan Patino    Requested Prescriptions     Pending Prescriptions Disp Refills    pantoprazole (PROTONIX) 20 mg tablet 90 Tablet      Sig: Take 1 Tablet by mouth daily. For [address] [facility] in place:   Recommendation Provided To:    Intervention Detail: New Rx: 1, reason: Patient Preference  Gap Closed?:   Intervention Accepted By:   Time Spent (min): 5

## 2022-10-28 NOTE — PATIENT INSTRUCTIONS
Vaccine Information Statement    Influenza (Flu) Vaccine (Inactivated or Recombinant): What You Need to Know    Many vaccine information statements are available in [place] and other languages. See www.immunize.org/vis. Hojas de información sobre vacunas están disponibles en español y en muchos otros idiomas. Visite www.immunize.org/vis. 1. Why get vaccinated? Influenza vaccine can prevent influenza (flu). Flu is a contagious disease that spreads around the [facility] every year, usually between October and May. Anyone can get the flu, but it is more dangerous for some people. Infants and young children, people 72 years and older, pregnant people, and people with certain health conditions or a weakened immune system are at greatest risk of flu complications. Pneumonia, bronchitis, sinus infections, and ear infections are examples of flu-related complications. If you have a medical condition, such as heart disease, cancer, or diabetes, flu can make it worse. Flu can cause fever and chills, sore throat, muscle aches, fatigue, cough, headache, and runny or stuffy nose. Some people may have vomiting and diarrhea, though this is more common in children than adults. In an average year, thousands of people in the [facility] die from flu, and many more are hospitalized. Flu vaccine prevents millions of illnesses and flu-related visits to the doctor each year. 2. Influenza vaccines     CDC recommends everyone 6 months and older get vaccinated every flu season. Children 6 months through 6years of age may need 2 doses during a single flu season. Everyone else needs only 1 dose each flu season. It takes about 2 weeks for protection to develop after vaccination. There are many flu viruses, and they are always changing. Each year a new flu vaccine is made to protect against the influenza viruses believed to be likely to cause disease in the upcoming flu season.  Even when the vaccine doesnt exactly match these viruses, it may still provide some protection. Influenza vaccine does not cause flu. Influenza vaccine may be given at the same time as other vaccines. 3. Talk with your health care provider    Tell your vaccination provider if the person getting the vaccine:  Has had an allergic reaction after a previous dose of influenza vaccine, or has any severe, life-threatening allergies   Has ever had Guillain-Barré Syndrome (also called GBS)    In some cases, your health care provider may decide to postpone influenza vaccination until a future visit. Influenza vaccine can be administered at any time during pregnancy. People who are or will be pregnant during influenza season should receive inactivated influenza vaccine. People with minor illnesses, such as a cold, may be vaccinated. People who are moderately or severely ill should usually wait until they recover before getting influenza vaccine. Your health care provider can give you more information. 4. Risks of a vaccine reaction    Soreness, redness, and swelling where the shot is given, fever, muscle aches, and headache can happen after influenza vaccination. There may be a very small increased risk of Guillain-Barré Syndrome (GBS) after inactivated influenza vaccine (the flu shot). [facility] children who get the flu shot along with pneumococcal vaccine (PCV13) and/or DTaP vaccine at the same time might be slightly more likely to have a seizure caused by fever. Tell your health care provider if a child who is getting flu vaccine has ever had a seizure. People sometimes faint after medical procedures, including vaccination. Tell your provider if you feel dizzy or have vision changes or ringing in the ears. As with any medicine, there is a very remote chance of a vaccine causing a severe allergic reaction, other serious injury, or death. 5. What if there is a serious problem?     An allergic reaction could occur after the vaccinated person leaves the clinic. If you see signs of a severe allergic reaction (hives, swelling of the face and throat, difficulty breathing, a fast heartbeat, dizziness, or weakness), call 9-1-1 and get the person to the nearest hospital.    For other signs that concern you, call your health care provider. Adverse reactions should be reported to the Vaccine Adverse Event Reporting System (VAERS). Your health care provider will usually file this report, or you can do it yourself. Visit the VAERS website at www.vaers. [facility].gov or call 2-103.590.9853. VAERS is only for reporting reactions, and VAERS staff members do not give medical advice. 6. The National Vaccine Injury Compensation Program    The [facility] Vaccine Injury Compensation Program (VICP) is a federal program that was created to compensate people who may have been injured by certain vaccines. Claims regarding alleged injury or death due to vaccination have a time limit for filing, which may be as short as two years. Visit the VICP website at www.[facility].gov/vaccinecompensation or call 5-744.187.3048 to learn about the program and about filing a claim. 7. How can I learn more? Ask your health care provider. Call your local or state health department. Visit the website of the Food and Drug Administration (FDA) for vaccine package inserts and additional information at www.fda.gov/vaccines-blood-biologics/vaccines. Contact the Centers for Disease Control and Prevention (CDC): Call 8-918.550.2460 (1-800-CDC-INFO) or  Visit CDCs influenza website at www.cdc.gov/flu. Vaccine Information Statement   Inactivated Influenza Vaccine   8/6/2021  42 JAZMIN Chilel [id number]   Department of Health and Human Services  Centers for Disease Control and Prevention    Office Use Only

## 2022-11-14 ENCOUNTER — OFFICE VISIT (OUTPATIENT)
Dept: INTERNAL MEDICINE CLINIC | Age: 82
End: 2022-11-14
Payer: MEDICARE

## 2022-11-14 VITALS
HEIGHT: 60 IN | HEART RATE: 79 BPM | RESPIRATION RATE: 18 BRPM | BODY MASS INDEX: 22.38 KG/M2 | SYSTOLIC BLOOD PRESSURE: 115 MMHG | OXYGEN SATURATION: 97 % | DIASTOLIC BLOOD PRESSURE: 65 MMHG | TEMPERATURE: 97.1 F | WEIGHT: 114 LBS

## 2022-11-14 DIAGNOSIS — F41.9 ANXIETY: Primary | ICD-10-CM

## 2022-11-14 DIAGNOSIS — Z51.81 THERAPEUTIC DRUG MONITORING: ICD-10-CM

## 2022-11-14 DIAGNOSIS — I10 ESSENTIAL HYPERTENSION: ICD-10-CM

## 2022-11-14 DIAGNOSIS — G47.09 OTHER INSOMNIA: ICD-10-CM

## 2022-11-14 PROCEDURE — 1090F PRES/ABSN URINE INCON ASSESS: CPT | Performed by: INTERNAL MEDICINE

## 2022-11-14 PROCEDURE — 1101F PT FALLS ASSESS-DOCD LE1/YR: CPT | Performed by: INTERNAL MEDICINE

## 2022-11-14 PROCEDURE — G8420 CALC BMI NORM PARAMETERS: HCPCS | Performed by: INTERNAL MEDICINE

## 2022-11-14 PROCEDURE — G0463 HOSPITAL OUTPT CLINIC VISIT: HCPCS | Performed by: INTERNAL MEDICINE

## 2022-11-14 PROCEDURE — G8427 DOCREV CUR MEDS BY ELIG CLIN: HCPCS | Performed by: INTERNAL MEDICINE

## 2022-11-14 PROCEDURE — 1123F ACP DISCUSS/DSCN MKR DOCD: CPT | Performed by: INTERNAL MEDICINE

## 2022-11-14 PROCEDURE — G8510 SCR DEP NEG, NO PLAN REQD: HCPCS | Performed by: INTERNAL MEDICINE

## 2022-11-14 PROCEDURE — 3078F DIAST BP <80 MM HG: CPT | Performed by: INTERNAL MEDICINE

## 2022-11-14 PROCEDURE — G8400 PT W/DXA NO RESULTS DOC: HCPCS | Performed by: INTERNAL MEDICINE

## 2022-11-14 PROCEDURE — 3074F SYST BP LT 130 MM HG: CPT | Performed by: INTERNAL MEDICINE

## 2022-11-14 PROCEDURE — G8536 NO DOC ELDER MAL SCRN: HCPCS | Performed by: INTERNAL MEDICINE

## 2022-11-14 PROCEDURE — 99214 OFFICE O/P EST MOD 30 MIN: CPT | Performed by: INTERNAL MEDICINE

## 2022-11-14 RX ORDER — LORAZEPAM 0.5 MG/1
0.5 TABLET ORAL
Qty: 30 TABLET | Refills: 4 | Status: SHIPPED | OUTPATIENT
Start: 2022-11-14

## 2022-11-14 RX ORDER — LOSARTAN POTASSIUM 50 MG/1
50 TABLET ORAL DAILY
Qty: 90 TABLET | Refills: 3 | Status: SHIPPED | OUTPATIENT
Start: 2022-11-14

## 2022-11-14 RX ORDER — LOSARTAN POTASSIUM 50 MG/1
50 TABLET ORAL DAILY
Qty: 90 TABLET | Refills: 3 | Status: SHIPPED | OUTPATIENT
Start: 2022-11-14 | End: 2022-11-14

## 2022-11-14 NOTE — PROGRESS NOTES
Rubne Mathur presents today for   Chief Complaint   Patient presents with    Anxiety     Lab results       Is someone accompanying this pt? Yes, daughter    Is the patient using any DME equipment during [address]? no    Depression Screening:  3 most recent PHQ Screens 11/14/2022   Little interest or pleasure in doing things Not at all   Feeling down, depressed, irritable, or hopeless Not at all   Total Score PHQ 2 0       Learning Assessment:  Learning Assessment 3/14/2022   PRIMARY LEARNER Patient   BARRIERS PRIMARY LEARNER HEARING   PRIMARY LANGUAGE ENGLISH   LEARNER PREFERENCE PRIMARY PICTURES   ANSWERED BY patient   RELATIONSHIP SELF       Abuse Screening:  Abuse Screening Questionnaire 10/10/2022   Do you ever feel afraid of your partner? N   Are you in a relationship with someone who physically or mentally threatens you? N   Is it safe for you to go home? Y       Fall Risk  Fall Risk Assessment, last 12 mths 11/14/2022   Able to walk? Yes   Fall in past 12 months? 0   Do you feel unsteady? 0   Are you worried about falling 0       ADL  No flowsheet data found. Health Maintenance reviewed and discussed and ordered per Provider. Health Maintenance Due   Topic Date Due    Bone Densitometry (Dexa) Screening  Never done    Medicare Yearly Exam  Never done    Shingrix Vaccine Age 50> (2 of 2) 09/30/2020    COVID-19 Vaccine (3 - Booster for Moderna series) 04/20/2021   . Coordination of Care:  1. Have you been to the ER, urgent care clinic since your last visit? Hospitalized since your last visit? no    2. Have you seen or consulted any other health care providers outside of the [address] [place] [address] since your last visit? Include any pap smears or colon screening. no    3. For patients aged 39-70: Has the patient had a colonoscopy? NA - based on age     If the patient is female:    4. For patients aged 41-77: Has the patient had a mammogram within the past 2 years? NA - based on age    11.  For patients aged 21-65: Has the patient had a pap smear?  NA - based on age

## 2022-11-20 NOTE — PROGRESS NOTES
PANKAJ Lin is here with her daughter and her  in follow-up of her anxiety. She is only been taking her SSRI for 2 weeks, but her daughter reports she accidentally took all of her lorazepam, using it 3 times daily for 10 to 14 days. It did help with the sleep, and she denies any intolerance of her SSRI thus far. Past Medical History:   Diagnosis Date    Anxiety     Hyperlipidemia     Hypertension         Past Surgical History:   Procedure Laterality Date    HX HYSTERECTOMY N/A         Current Outpatient Medications   Medication Sig Dispense Refill    losartan (COZAAR) 50 mg tablet Take 1 Tablet by mouth daily. 90 Tablet 3    LORazepam (ATIVAN) 0.5 mg tablet Take 1 Tablet by mouth nightly as needed for Anxiety. Max Daily Amount: 0.5 mg. 30 Tablet 4    pantoprazole (PROTONIX) 20 mg tablet Take 1 Tablet by mouth daily. 90 Tablet 2    escitalopram oxalate (LEXAPRO) 10 mg tablet Take 1 Tablet by mouth daily. 90 Tablet 0    meloxicam (MOBIC) 7.5 mg tablet 1 p.o. daily as needed pain. Take all doses with food. Continue Protonix. 90 Tablet 0    lidocaine (LIDODERM) 5 % APPLY 1 TO 3 PATCHES TOPICALLY TO THE SKIN EVERY DAY. MAY WEAR UP TO 12 HOURS. REMOVE PATCHES FOR 12 HOURS 90 Each 1    atorvastatin (LIPITOR) 40 mg tablet Take 40 mg by mouth daily. calcium-cholecalciferol, d3, 600-125 mg-unit tab Take 1 Tablet by mouth two (2) times a day. vit C/vit E ac/lut/copper/zinc (PRESERVISION LUTEIN PO) Take 1 Tablet by mouth daily.           No Known Allergies     Social History     Socioeconomic History    Marital status:      Spouse name: Not on file    Number of children: Not on file    Years of education: Not on file    Highest education level: Not on file   Occupational History    Not on file   Tobacco Use    Smoking status: Never    Smokeless tobacco: Never   Substance and Sexual Activity    Alcohol use: Not on file     Comment: 2/week    Drug use: Not on file    Sexual activity: Not on file   Other Topics Concern    Not on file   Social History Narrative    Not on file     Social Determinants of Health     Financial Resource Strain: Not on file   Food Insecurity: Not on file   Transportation Needs: Not on file   Physical Activity: Not on file   Stress: Not on file   Social Connections: Not on file   Intimate Partner Violence: Not on file   Housing Stability: Not on file        Family History   Problem Relation Age of Onset    Hypertension Mother     Stroke Mother     Hypertension Father     Heart Attack Father     Dementia Sister     Stroke Brother     High Cholesterol Daughter     Hypertension Daughter            Visit Vitals  /65   Pulse 79   Temp 97.1 °F (36.2 °C) (Temporal)   Resp 18   Ht 5' (1.524 m)   Wt 114 lb (51.7 kg)   SpO2 97%   BMI 22.26 kg/m²        Review of Systems   Respiratory:  Negative for shortness of breath. Cardiovascular:  Negative for chest pain. Gastrointestinal:  Negative for blood in stool. Genitourinary:  Negative for hematuria. Psychiatric/Behavioral:  The patient is nervous/anxious. Physical Exam  Constitutional:       General: She is not in acute distress. Appearance: She is normal weight. Eyes:      General: No scleral icterus. Conjunctiva/sclera: Conjunctivae normal.   Cardiovascular:      Rate and Rhythm: Normal rate and regular rhythm. Pulses: Normal pulses. Heart sounds: No friction rub. No gallop. Pulmonary:      Effort: Pulmonary effort is normal.      Breath sounds: Normal breath sounds. Abdominal:      General: Abdomen is flat. Palpations: Abdomen is soft. Tenderness: There is no abdominal tenderness. Musculoskeletal:      Cervical back: Neck supple. Comments: No cyanosis, or edema. No clubbing,   Skin:     General: Skin is warm and dry. Neurological:      Mental Status: She is alert and oriented to person, place, and time.    Psychiatric:         Mood and Affect: Mood normal. Diagnoses and all orders for this visit:    1. Anxiety  Comments:  Persists, has only been on therapeutic SSRI dose for 2 weeks, continue, call in 2 weeks if desires change. Orders:  -     LORazepam (ATIVAN) 0.5 mg tablet; Take 1 Tablet by mouth nightly as needed for Anxiety. Max Daily Amount: 0.5 mg.    2. Essential hypertension  Comments:  Controlled, continue medication. Orders:  -     losartan (COZAAR) 50 mg tablet; Take 1 Tablet by mouth daily. 3. Other insomnia  Comments:  Refill lorazepam 0.5 mg nightly as needed 30# +  4 refills  Orders:  -     LORazepam (ATIVAN) 0.5 mg tablet; Take 1 Tablet by mouth nightly as needed for Anxiety. Max Daily Amount: 0.5 mg.    4. Therapeutic drug monitoring  Comments:  Check LFTs, CBC, creatinine about every 6 months on meloxicam.  Current GFR of 58, excellent for age and comorbidities. Follow-up and Dispositions    Return in about 6 months (around 5/14/2023) for MWL/ acp.               Jesika Nieves MD

## 2023-01-19 DIAGNOSIS — G89.29 CHRONIC LOW BACK PAIN WITHOUT SCIATICA, UNSPECIFIED BACK PAIN LATERALITY: ICD-10-CM

## 2023-01-19 DIAGNOSIS — M54.50 CHRONIC LOW BACK PAIN WITHOUT SCIATICA, UNSPECIFIED BACK PAIN LATERALITY: ICD-10-CM

## 2023-01-19 DIAGNOSIS — F41.9 ANXIETY: ICD-10-CM

## 2023-01-19 RX ORDER — MELOXICAM 7.5 MG/1
TABLET ORAL
Qty: 90 TABLET | Refills: 0 | Status: SHIPPED | OUTPATIENT
Start: 2023-01-19

## 2023-01-19 RX ORDER — ESCITALOPRAM OXALATE 10 MG/1
10 TABLET ORAL DAILY
Qty: 90 TABLET | Refills: 1 | Status: SHIPPED | OUTPATIENT
Start: 2023-01-19

## 2023-01-23 ENCOUNTER — TELEPHONE (OUTPATIENT)
Dept: INTERNAL MEDICINE CLINIC | Age: 83
End: 2023-01-23

## 2023-01-23 DIAGNOSIS — Z12.31 ENCOUNTER FOR SCREENING MAMMOGRAM FOR MALIGNANT NEOPLASM OF BREAST: Primary | ICD-10-CM

## 2023-01-23 NOTE — TELEPHONE ENCOUNTER
Patient is over due for her mammogram. She is requesting an order. I advised her that she will be contacted to schedule.

## 2023-01-27 ENCOUNTER — TELEPHONE (OUTPATIENT)
Dept: INTERNAL MEDICINE CLINIC | Age: 83
End: 2023-01-27

## 2023-01-27 DIAGNOSIS — Z12.31 ENCOUNTER FOR SCREENING MAMMOGRAM FOR MALIGNANT NEOPLASM OF BREAST: ICD-10-CM

## 2023-02-15 ENCOUNTER — TELEPHONE (OUTPATIENT)
Age: 83
End: 2023-02-15

## 2023-02-15 NOTE — TELEPHONE ENCOUNTER
Spoke with Daughter, Arie Alvarenga and gave her Dr. Claudia Montano message. Daughter voiced understanding and will call back in a week if not working.

## 2023-02-15 NOTE — TELEPHONE ENCOUNTER
Daughter calling says the depression medication is not working. Pt is still down and out. Wants to know if there is something else they can try? She didn't know the name of the med.

## 2023-02-15 NOTE — TELEPHONE ENCOUNTER
I would try increasing the escitalopram to 1and 1/2 tablets daily for a week, then 2 tablets daily, let me know after a week of that if she is no better,

## 2023-03-16 ENCOUNTER — TELEPHONE (OUTPATIENT)
Age: 83
End: 2023-03-16

## 2023-03-16 DIAGNOSIS — F32.A DEPRESSION, UNSPECIFIED DEPRESSION TYPE: Primary | ICD-10-CM

## 2023-03-16 DIAGNOSIS — F41.1 GENERALIZED ANXIETY DISORDER: ICD-10-CM

## 2023-03-16 RX ORDER — BUPROPION HYDROCHLORIDE 75 MG/1
TABLET ORAL
Qty: 180 TABLET | Refills: 0 | Status: SHIPPED | OUTPATIENT
Start: 2023-03-16 | End: 2023-05-15

## 2023-03-16 RX ORDER — ESCITALOPRAM OXALATE 20 MG/1
20 TABLET ORAL DAILY
Qty: 90 TABLET | Refills: 1 | Status: SHIPPED | OUTPATIENT
Start: 2023-03-16

## 2023-03-20 ENCOUNTER — TELEPHONE (OUTPATIENT)
Age: 83
End: 2023-03-20

## 2023-03-20 NOTE — TELEPHONE ENCOUNTER
Patient daughter called in would like for the dr to give her a called pertaining to her mother depression medication. Please Advise

## 2023-03-21 NOTE — TELEPHONE ENCOUNTER
Patients daughter is calling asking to speak with Dr. Andrei Casiano. Stating she is concerned because her Mother hasn't been taking her medication correctly. She only wants to speak with the doctor.

## 2023-03-21 NOTE — TELEPHONE ENCOUNTER
Spoke with daughter Guillermina Mendiola 3/21/2023 at 6:05 PM.  Mrs. Aracelis Patel took Lexapro 10 mg, 1-1/2 tablet daily for a week, then dropped it back to 10 mg daily. Her daughter Dez Romero did not think the medication was working, until she realized that her mother never increased it to 20 mg.  Dez Romero reports feeling very jittery with the Wellbutrin that I had sent before we do this, does not want to give her brother Wellbutrin. A total of 5 to just gradually increase the Lexapro to 20 mg daily. If this does not help, I would again consider adding a very low-dose of Wellbutrin. Discussed that while the success of depression medication in 1 person may mean that it will work well in a relative, but side effects are not usually hereditary.

## 2023-04-17 ENCOUNTER — TELEPHONE (OUTPATIENT)
Age: 83
End: 2023-04-17

## 2023-04-17 DIAGNOSIS — E78.00 PURE HYPERCHOLESTEROLEMIA, UNSPECIFIED: ICD-10-CM

## 2023-04-17 DIAGNOSIS — E78.00 PURE HYPERCHOLESTEROLEMIA, UNSPECIFIED: Primary | ICD-10-CM

## 2023-04-17 RX ORDER — ATORVASTATIN CALCIUM 40 MG/1
40 TABLET, FILM COATED ORAL DAILY
Qty: 90 TABLET | Refills: 3 | Status: SHIPPED | OUTPATIENT
Start: 2023-04-17

## 2023-04-17 RX ORDER — ATORVASTATIN CALCIUM 40 MG/1
40 TABLET, FILM COATED ORAL DAILY
Qty: 15 TABLET | Refills: 1 | Status: SHIPPED | OUTPATIENT
Start: 2023-04-17 | End: 2023-04-17

## 2023-04-17 NOTE — TELEPHONE ENCOUNTER
REFILL   ATORVASTATIN   [facility]TS           Pt daughter states pt only has 4 pills left and is asking if Dr Shi Jimenez could send a temp refill to bridge her until she get her mail delivery please send temp refill to [facility].

## 2023-04-20 DIAGNOSIS — F41.1 GENERALIZED ANXIETY DISORDER: Primary | ICD-10-CM

## 2023-04-20 RX ORDER — LORAZEPAM 0.5 MG/1
TABLET ORAL
Qty: 30 TABLET | Refills: 5 | Status: SHIPPED | OUTPATIENT
Start: 2023-04-20 | End: 2023-10-10

## 2023-05-15 ENCOUNTER — OFFICE VISIT (OUTPATIENT)
Age: 83
End: 2023-05-15
Payer: MEDICARE

## 2023-05-15 DIAGNOSIS — Z00.00 MEDICARE ANNUAL WELLNESS VISIT, SUBSEQUENT: Primary | ICD-10-CM

## 2023-05-15 DIAGNOSIS — I10 ESSENTIAL (PRIMARY) HYPERTENSION: ICD-10-CM

## 2023-05-15 DIAGNOSIS — M54.50 CHRONIC BILATERAL LOW BACK PAIN, UNSPECIFIED WHETHER SCIATICA PRESENT: ICD-10-CM

## 2023-05-15 DIAGNOSIS — Z51.81 THERAPEUTIC DRUG MONITORING: ICD-10-CM

## 2023-05-15 DIAGNOSIS — F33.42 RECURRENT MAJOR DEPRESSIVE DISORDER, IN FULL REMISSION (HCC): ICD-10-CM

## 2023-05-15 DIAGNOSIS — G89.29 CHRONIC BILATERAL LOW BACK PAIN, UNSPECIFIED WHETHER SCIATICA PRESENT: ICD-10-CM

## 2023-05-15 PROBLEM — F33.9 MAJOR DEPRESSIVE DISORDER, RECURRENT, UNSPECIFIED (HCC): Status: ACTIVE | Noted: 2023-05-15

## 2023-05-15 PROCEDURE — 3078F DIAST BP <80 MM HG: CPT | Performed by: INTERNAL MEDICINE

## 2023-05-15 PROCEDURE — 1123F ACP DISCUSS/DSCN MKR DOCD: CPT | Performed by: INTERNAL MEDICINE

## 2023-05-15 PROCEDURE — 1090F PRES/ABSN URINE INCON ASSESS: CPT | Performed by: INTERNAL MEDICINE

## 2023-05-15 PROCEDURE — 99214 OFFICE O/P EST MOD 30 MIN: CPT | Performed by: INTERNAL MEDICINE

## 2023-05-15 PROCEDURE — 3075F SYST BP GE 130 - 139MM HG: CPT | Performed by: INTERNAL MEDICINE

## 2023-05-15 PROCEDURE — G8427 DOCREV CUR MEDS BY ELIG CLIN: HCPCS | Performed by: INTERNAL MEDICINE

## 2023-05-15 PROCEDURE — 99211 OFF/OP EST MAY X REQ PHY/QHP: CPT | Performed by: INTERNAL MEDICINE

## 2023-05-15 PROCEDURE — G8400 PT W/DXA NO RESULTS DOC: HCPCS | Performed by: INTERNAL MEDICINE

## 2023-05-15 PROCEDURE — 99497 ADVNCD CARE PLAN 30 MIN: CPT | Performed by: INTERNAL MEDICINE

## 2023-05-15 PROCEDURE — G0439 PPPS, SUBSEQ VISIT: HCPCS | Performed by: INTERNAL MEDICINE

## 2023-05-15 PROCEDURE — G8420 CALC BMI NORM PARAMETERS: HCPCS | Performed by: INTERNAL MEDICINE

## 2023-05-15 PROCEDURE — 1036F TOBACCO NON-USER: CPT | Performed by: INTERNAL MEDICINE

## 2023-05-15 RX ORDER — MELOXICAM 7.5 MG/1
TABLET ORAL
Qty: 90 TABLET | Refills: 1 | Status: SHIPPED | OUTPATIENT
Start: 2023-05-15

## 2023-05-15 RX ORDER — LOSARTAN POTASSIUM 50 MG/1
50 TABLET ORAL DAILY
Qty: 90 TABLET | Refills: 3 | Status: SHIPPED | OUTPATIENT
Start: 2023-05-15

## 2023-05-15 SDOH — ECONOMIC STABILITY: FOOD INSECURITY: WITHIN THE PAST 12 MONTHS, THE FOOD YOU BOUGHT JUST DIDN'T LAST AND YOU DIDN'T HAVE MONEY TO GET MORE.: NEVER TRUE

## 2023-05-15 SDOH — ECONOMIC STABILITY: INCOME INSECURITY: HOW HARD IS IT FOR YOU TO PAY FOR THE VERY BASICS LIKE FOOD, HOUSING, MEDICAL CARE, AND HEATING?: NOT HARD AT ALL

## 2023-05-15 SDOH — ECONOMIC STABILITY: HOUSING INSECURITY
IN THE LAST 12 MONTHS, WAS THERE A TIME WHEN YOU DID NOT HAVE A STEADY PLACE TO SLEEP OR SLEPT IN A SHELTER (INCLUDING NOW)?: NO

## 2023-05-15 SDOH — ECONOMIC STABILITY: FOOD INSECURITY: WITHIN THE PAST 12 MONTHS, YOU WORRIED THAT YOUR FOOD WOULD RUN OUT BEFORE YOU GOT MONEY TO BUY MORE.: NEVER TRUE

## 2023-05-15 ASSESSMENT — PATIENT HEALTH QUESTIONNAIRE - PHQ9
1. LITTLE INTEREST OR PLEASURE IN DOING THINGS: 0
SUM OF ALL RESPONSES TO PHQ QUESTIONS 1-9: 0
SUM OF ALL RESPONSES TO PHQ QUESTIONS 1-9: 0
2. FEELING DOWN, DEPRESSED OR HOPELESS: 0
SUM OF ALL RESPONSES TO PHQ QUESTIONS 1-9: 0
SUM OF ALL RESPONSES TO PHQ QUESTIONS 1-9: 0
SUM OF ALL RESPONSES TO PHQ9 QUESTIONS 1 & 2: 0

## 2023-05-15 ASSESSMENT — LIFESTYLE VARIABLES
HOW OFTEN DO YOU HAVE A DRINK CONTAINING ALCOHOL: MONTHLY OR LESS
HOW MANY STANDARD DRINKS CONTAINING ALCOHOL DO YOU HAVE ON A TYPICAL DAY: 1 OR 2

## 2023-05-15 NOTE — PROGRESS NOTES
HOWIE Montes De Oca is here with her  and her daughter for Medicare wellness/advanced care planning visit. She reports her anxiety is doing well,, and the meloxicam is helping her arthritis pain. She does take it daily, with food and Protonix. No known new family history reported. Past Medical History:   Diagnosis Date    Anxiety     Hyperlipidemia     Hypertension         Past Surgical History:   Procedure Laterality Date    HYSTERECTOMY (CERVIX STATUS UNKNOWN) N/A         Current Outpatient Medications   Medication Sig Dispense Refill    Multiple Vitamin (ANTIOXIDANT A/C/E PO) Take by mouth      meloxicam (MOBIC) 7.5 MG tablet 1 p.o. daily as needed pain. Take all doses with food. Continue Protonix. 90 tablet 1    losartan (COZAAR) 50 MG tablet Take 1 tablet by mouth daily 90 tablet 3    LORazepam (ATIVAN) 0.5 MG tablet 1 p.o. nightly as needed 30 tablet 5    atorvastatin (LIPITOR) 40 MG tablet Take 1 tablet by mouth daily 90 tablet 3    escitalopram (LEXAPRO) 20 MG tablet Take 1 tablet by mouth daily 90 tablet 1    Calcium Carbonate-Vitamin D 600-3. 125 MG-MCG TABS Take 1 tablet by mouth 2 times daily      lidocaine (LIDODERM) 5 % APPLY 1 TO 3 PATCHES TOPICALLY TO THE SKIN EVERY DAY. MAY WEAR UP TO 12 HOURS. REMOVE PATCHES FOR 12 HOURS      pantoprazole (PROTONIX) 20 MG tablet Take 1 tablet by mouth daily       No current facility-administered medications for this visit.         No Known Allergies     Social History     Socioeconomic History    Marital status:      Spouse name: Not on file    Number of children: Not on file    Years of education: Not on file    Highest education level: Not on file   Occupational History    Not on file   Tobacco Use    Smoking status: Never    Smokeless tobacco: Never   Substance and Sexual Activity    Alcohol use: Not on file    Drug use: Not on file    Sexual activity: Not Currently     Partners: Male   Other Topics Concern    Not on file   Social

## 2023-05-15 NOTE — PROGRESS NOTES
Yenni Silva presents today for No chief complaint on file. 1. \"Have you been to the ER, urgent care clinic since your last visit? Hospitalized since your last visit? \" no    2. \"Have you seen or consulted any other health care providers outside of the [address] [place] [address] since your last visit? \" no     3. For patients aged 39-70: Has the patient had a colonoscopy / FIT/ Cologuard? NA - based on age      If the patient is female:    4. For patients aged 41-77: Has the patient had a mammogram within the past 2 years? NA - based on age or sex      11. For patients aged 21-65: Has the patient had a pap smear?  NA - based on age or sex

## 2023-05-16 ENCOUNTER — HOSPITAL ENCOUNTER (OUTPATIENT)
Facility: HOSPITAL | Age: 83
Discharge: HOME | End: 2023-05-19
Payer: MEDICARE

## 2023-05-16 DIAGNOSIS — Z12.31 SCREENING MAMMOGRAM FOR HIGH-RISK PATIENT: ICD-10-CM

## 2023-05-16 PROCEDURE — 77063 BREAST TOMOSYNTHESIS BI: CPT

## 2023-05-17 VITALS
DIASTOLIC BLOOD PRESSURE: 74 MMHG | HEART RATE: 77 BPM | RESPIRATION RATE: 18 BRPM | HEIGHT: 60 IN | BODY MASS INDEX: 22.78 KG/M2 | WEIGHT: 116 LBS | SYSTOLIC BLOOD PRESSURE: 134 MMHG | OXYGEN SATURATION: 97 % | TEMPERATURE: 96.4 F

## 2023-05-17 PROBLEM — Z00.00 MEDICARE ANNUAL WELLNESS VISIT, SUBSEQUENT: Status: ACTIVE | Noted: 2023-05-17

## 2023-05-17 ASSESSMENT — ENCOUNTER SYMPTOMS
BLOOD IN STOOL: 0
SHORTNESS OF BREATH: 0

## 2023-05-17 NOTE — ACP (ADVANCE CARE PLANNING)
Advance Care Planning     Advance Care Planning (ACP) Physician/NP/PA Conversation    Date of Conversation: 5/15/2023  Conducted with: Patient with Decision Making Capacity    Healthcare Decision Maker:        Click here to complete [address] including selection of the Healthcare Decision Maker Relationship (ie \"Primary\")  Today we documented Decision Maker(s) consistent with Legal Next of Kin hierarchy. Care Preferences:    Hospitalization: \"If your health worsens and it becomes clear that your chance of recovery is unlikely, what would be your preference regarding hospitalization? \"  The patient would prefer comfort-focused treatment without hospitalization. Ventilation: \"If you were unable to breath on your own and your chance of recovery was unlikely, what would be your preference about the use of a ventilator (breathing machine) if it was available to you? \"  The patient would desire the use of a ventilator. Resuscitation: \"In the event your heart stopped as a result of an underlying serious health condition, would you want attempts made to restart your heart, or would you prefer a natural death? \"  Yes, attempt to resuscitate.     artificial nutrition no feeding rube    Conversation Outcomes / Follow-Up Plan:  ACP complete - no further action today  Reviewed DNR/DNI and patient elects Full Code (Attempt Resuscitation)    Length of Voluntary ACP Conversation in minutes:  16 minutes    Nancy Gray MD

## 2023-05-23 NOTE — TELEPHONE ENCOUNTER
Requested Prescriptions     Pending Prescriptions Disp Refills    lidocaine (LIDODERM) 5 % 30 patch 3     Sig: APPLY 1 TO 3 PATCHES TOPICALLY TO THE SKIN EVERY DAY. MAY WEAR UP TO 12 HOURS. REMOVE PATCHES FOR 12 HOURS        Patient was last seen on 5/16/22. Patient's choice of pharmacy [facility], [address] [facility] The refill request's last refill info   lidocaine (LIDODERM) 5 % 90 Each 1 4/13/2022     Sig: APPLY 1 TO 3 PATCHES TOPICALLY TO THE SKIN EVERY DAY. MAY WEAR UP TO 12 HOURS.  REMOVE PATCHES FOR 12 HOURS    Sent to pharmacy as: lidocaine 5 % topical patch (LIDODERM)    E-Prescribing Status: Receipt confirmed by pharmacy (4/13/2022  3:23 PM EDT)

## 2023-05-24 RX ORDER — LIDOCAINE 50 MG/G
PATCH TOPICAL
Qty: 30 PATCH | Refills: 3 | OUTPATIENT
Start: 2023-05-24

## 2023-06-16 PROBLEM — Z00.00 MEDICARE ANNUAL WELLNESS VISIT, SUBSEQUENT: Status: RESOLVED | Noted: 2023-05-17 | Resolved: 2023-06-16

## 2023-06-23 RX ORDER — PANTOPRAZOLE SODIUM 20 MG/1
20 TABLET, DELAYED RELEASE ORAL DAILY
Qty: 90 TABLET | Refills: 2 | Status: SHIPPED | OUTPATIENT
Start: 2023-06-23

## 2023-10-11 ENCOUNTER — TELEPHONE (OUTPATIENT)
Age: 83
End: 2023-10-11

## 2023-10-11 DIAGNOSIS — R41.3 MEMORY LOSS: Primary | ICD-10-CM

## 2023-10-11 NOTE — TELEPHONE ENCOUNTER
Pt daughter shayne states mom is having memory issues and wants to know if she needs to make an ov appt or can she get a referral to see a neurologist.    Cb [phone]  Please advise

## 2023-10-11 NOTE — TELEPHONE ENCOUNTER
Referral sent to neurology, may take a few weeks to hear from them.   Please give them a copy of the referral to Dr. Valeriano Engle with their phone #

## 2023-10-11 NOTE — TELEPHONE ENCOUNTER
----- Message from Cali Velazquez sent at 10/11/2023  9:19 AM EDT -----  Subject: Appointment Request    Reason for Call: Established Patient Appointment needed: Routine (Patient   Request) No Script    QUESTIONS    Reason for appointment request? Available appointments did not meet   patient need     Additional Information for Provider? daughter John Kelley stated that pt is   having some memory issues and would like to be seen sooner than what avail   she also stated that she would like a pt to be referred to a neurologist   as well please follow up   ---------------------------------------------------------------------------  --------------  [address]  [phone]; OK to leave message on voicemail  ---------------------------------------------------------------------------  --------------  SCRIPT ANSWERS

## 2023-10-12 NOTE — TELEPHONE ENCOUNTER
Called and spoke to Liseth, given Dr. Sanford Pandey contact information and asked her to contact Dr. Sanford Pandey  office next week if she had not heard from them.

## 2023-10-18 ENCOUNTER — TELEPHONE (OUTPATIENT)
Age: 83
End: 2023-10-18

## 2023-10-18 DIAGNOSIS — F41.1 GENERALIZED ANXIETY DISORDER: ICD-10-CM

## 2023-10-18 RX ORDER — LORAZEPAM 0.5 MG/1
TABLET ORAL
Qty: 90 TABLET | Refills: 0 | Status: SHIPPED | OUTPATIENT
Start: 2023-10-18 | End: 2024-04-08

## 2023-10-18 NOTE — TELEPHONE ENCOUNTER
Please send refill to [facility].     LORazepam (ATIVAN) 0.5 MG tablet 90 tablet 0 6/15/2023 2023    Si p.o. nightly as needed

## 2023-10-18 NOTE — TELEPHONE ENCOUNTER
Dr. Kari Hercules message from other encounter:    Let daughter know I refilled her mother's lorazepam at the pharmacy. The formal radiologist reading of her knee x-ray showed no fracture, does have arthritis and does have a moderate amount of fluid in the joint, probably blood from her fall. Treatment is ice, Ace wrap to help with the swelling, Voltaren gel to the area 4 times daily.

## 2023-10-18 NOTE — TELEPHONE ENCOUNTER
Patient was seen at [facility] because she fell an injured her knee. Stating they did xrays and told her it was not broken however she is in A LOT of pain. Stating her knee is extremely swollen with fluid. Stating the Dr was very nonchalant and she is wondering if it really is broken and it was missed. She wants to know if Dr. Alexys Coombs can take a look at the xray.

## 2023-10-18 NOTE — PROGRESS NOTES
Let daughter know I refilled her mother's lorazepam at the pharmacy. The formal radiologist reading of her knee x-ray showed no fracture, does have arthritis and does have a moderate amount of fluid in the joint, probably blood from her fall. Treatment is ice, Ace wrap to help with the swelling, Voltaren gel to the area 4 times daily.

## 2023-11-08 ENCOUNTER — HOSPITAL ENCOUNTER (OUTPATIENT)
Facility: HOSPITAL | Age: 83
Setting detail: SPECIMEN
Discharge: HOME | End: 2023-11-11
Payer: MEDICARE

## 2023-11-08 DIAGNOSIS — Z51.81 THERAPEUTIC DRUG MONITORING: ICD-10-CM

## 2023-11-08 LAB
ALBUMIN SERPL-MCNC: 4.3 G/DL (ref 3.4–5)
ALBUMIN/GLOB SERPL: 1.4 (ref 0.8–1.7)
ALP SERPL-CCNC: 93 U/L (ref 45–117)
ALT SERPL-CCNC: 26 U/L (ref 13–56)
ANION GAP SERPL CALC-SCNC: 3 MMOL/L (ref 3–18)
AST SERPL-CCNC: 22 U/L (ref 10–38)
BILIRUB SERPL-MCNC: 0.4 MG/DL (ref 0.2–1)
BUN SERPL-MCNC: 28 MG/DL (ref 7–18)
BUN/CREAT SERPL: 27 (ref 12–20)
CALCIUM SERPL-MCNC: 9.8 MG/DL (ref 8.5–10.1)
CHLORIDE SERPL-SCNC: 102 MMOL/L (ref 100–111)
CO2 SERPL-SCNC: 28 MMOL/L (ref 21–32)
CREAT SERPL-MCNC: 1.04 MG/DL (ref 0.6–1.3)
ERYTHROCYTE [DISTWIDTH] IN BLOOD BY AUTOMATED COUNT: 14.8 % (ref 11.6–14.5)
GLOBULIN SER CALC-MCNC: 3.1 G/DL (ref 2–4)
GLUCOSE SERPL-MCNC: 100 MG/DL (ref 74–99)
HCT VFR BLD AUTO: 40.1 % (ref 35–45)
HGB BLD-MCNC: 13 G/DL (ref 12–16)
MCH RBC QN AUTO: 30.9 PG (ref 24–34)
MCHC RBC AUTO-ENTMCNC: 32.4 G/DL (ref 31–37)
MCV RBC AUTO: 95.2 FL (ref 78–100)
NRBC # BLD: 0 K/UL (ref 0–0.01)
NRBC BLD-RTO: 0 PER 100 WBC
PLATELET # BLD AUTO: 324 K/UL (ref 135–420)
PMV BLD AUTO: 10.8 FL (ref 9.2–11.8)
POTASSIUM SERPL-SCNC: 4.6 MMOL/L (ref 3.5–5.5)
PROT SERPL-MCNC: 7.4 G/DL (ref 6.4–8.2)
RBC # BLD AUTO: 4.21 M/UL (ref 4.2–5.3)
SODIUM SERPL-SCNC: 133 MMOL/L (ref 136–145)
WBC # BLD AUTO: 6.5 K/UL (ref 4.6–13.2)

## 2023-11-08 PROCEDURE — 80053 COMPREHEN METABOLIC PANEL: CPT

## 2023-11-08 PROCEDURE — 85027 COMPLETE CBC AUTOMATED: CPT

## 2023-11-08 PROCEDURE — 36415 COLL VENOUS BLD VENIPUNCTURE: CPT

## 2023-11-16 ENCOUNTER — OFFICE VISIT (OUTPATIENT)
Age: 83
End: 2023-11-16
Payer: MEDICARE

## 2023-11-16 VITALS
BODY MASS INDEX: 22.19 KG/M2 | SYSTOLIC BLOOD PRESSURE: 147 MMHG | HEIGHT: 60 IN | DIASTOLIC BLOOD PRESSURE: 96 MMHG | RESPIRATION RATE: 18 BRPM | HEART RATE: 85 BPM | OXYGEN SATURATION: 98 % | WEIGHT: 113 LBS | TEMPERATURE: 96.8 F

## 2023-11-16 DIAGNOSIS — M79.601 RIGHT ARM PAIN: ICD-10-CM

## 2023-11-16 DIAGNOSIS — I10 ESSENTIAL (PRIMARY) HYPERTENSION: Primary | ICD-10-CM

## 2023-11-16 DIAGNOSIS — R94.4 DECREASED GFR: ICD-10-CM

## 2023-11-16 DIAGNOSIS — F41.1 GENERALIZED ANXIETY DISORDER: ICD-10-CM

## 2023-11-16 DIAGNOSIS — G98.8 NEUROLOGIC DISORDER: ICD-10-CM

## 2023-11-16 PROCEDURE — G8427 DOCREV CUR MEDS BY ELIG CLIN: HCPCS | Performed by: INTERNAL MEDICINE

## 2023-11-16 PROCEDURE — G8420 CALC BMI NORM PARAMETERS: HCPCS | Performed by: INTERNAL MEDICINE

## 2023-11-16 PROCEDURE — 99214 OFFICE O/P EST MOD 30 MIN: CPT | Performed by: INTERNAL MEDICINE

## 2023-11-16 PROCEDURE — 1036F TOBACCO NON-USER: CPT | Performed by: INTERNAL MEDICINE

## 2023-11-16 PROCEDURE — G8484 FLU IMMUNIZE NO ADMIN: HCPCS | Performed by: INTERNAL MEDICINE

## 2023-11-16 PROCEDURE — 3079F DIAST BP 80-89 MM HG: CPT | Performed by: INTERNAL MEDICINE

## 2023-11-16 PROCEDURE — 3077F SYST BP >= 140 MM HG: CPT | Performed by: INTERNAL MEDICINE

## 2023-11-16 PROCEDURE — 1123F ACP DISCUSS/DSCN MKR DOCD: CPT | Performed by: INTERNAL MEDICINE

## 2023-11-16 PROCEDURE — 1090F PRES/ABSN URINE INCON ASSESS: CPT | Performed by: INTERNAL MEDICINE

## 2023-11-16 PROCEDURE — G8400 PT W/DXA NO RESULTS DOC: HCPCS | Performed by: INTERNAL MEDICINE

## 2023-11-16 ASSESSMENT — PATIENT HEALTH QUESTIONNAIRE - PHQ9
SUM OF ALL RESPONSES TO PHQ QUESTIONS 1-9: 0
1. LITTLE INTEREST OR PLEASURE IN DOING THINGS: 0
2. FEELING DOWN, DEPRESSED OR HOPELESS: 0
SUM OF ALL RESPONSES TO PHQ QUESTIONS 1-9: 0
SUM OF ALL RESPONSES TO PHQ9 QUESTIONS 1 & 2: 0
SUM OF ALL RESPONSES TO PHQ QUESTIONS 1-9: 0
SUM OF ALL RESPONSES TO PHQ QUESTIONS 1-9: 0

## 2023-11-16 ASSESSMENT — ENCOUNTER SYMPTOMS
SHORTNESS OF BREATH: 0
BACK PAIN: 1
BLOOD IN STOOL: 0

## 2023-11-16 NOTE — PROGRESS NOTES
Piero Rivera presents today for   Chief Complaint   Patient presents with    Hypertension                 1. \"Have you been to the ER, urgent care clinic since your last visit? Hospitalized since your last visit? \" no    2. \"Have you seen or consulted any other health care providers outside of the [address] [place] [address] since your last visit? \" no     3. For patients aged 43-73: Has the patient had a colonoscopy / FIT/ Cologuard? NA - based on age      If the patient is female:    4. For patients aged 43-66: Has the patient had a mammogram within the past 2 years? NA - based on age or sex      11. For patients aged 21-65: Has the patient had a pap smear?  NA - based on age or sex
disorder  Comments:  Persists, continue current medications    Decreased GFR  Comments:  Suspect related to Mobic use, try not to take daily. We will try to replace with Tylenol arthritis    Right arm pain  Comments:  Tenderness over biceps,? Muscle sprain. Try over-the-counter Voltaren gel 4 times daily as directed, let me know if ineffective         No follow-up provider specified.          Lester Ayon MD

## 2023-11-21 ENCOUNTER — TELEPHONE (OUTPATIENT)
Age: 83
End: 2023-11-21

## 2023-11-21 DIAGNOSIS — F41.1 GENERALIZED ANXIETY DISORDER: ICD-10-CM

## 2023-11-21 RX ORDER — ESCITALOPRAM OXALATE 20 MG/1
20 TABLET ORAL DAILY
Qty: 90 TABLET | Refills: 1 | Status: SHIPPED | OUTPATIENT
Start: 2023-11-21

## 2023-11-21 NOTE — TELEPHONE ENCOUNTER
Refill request via fax     escitalopram (LEXAPRO) 20 MG tablet 90 tablet 1     Sig - Route:  Take 1 tablet by mouth daily - Oral     [facility]

## 2023-11-30 ENCOUNTER — OFFICE VISIT (OUTPATIENT)
Age: 83
End: 2023-11-30
Payer: MEDICARE

## 2023-11-30 VITALS
WEIGHT: 117 LBS | BODY MASS INDEX: 22.85 KG/M2 | OXYGEN SATURATION: 97 % | SYSTOLIC BLOOD PRESSURE: 130 MMHG | RESPIRATION RATE: 18 BRPM | DIASTOLIC BLOOD PRESSURE: 84 MMHG | HEART RATE: 78 BPM

## 2023-11-30 DIAGNOSIS — R73.9 HYPERGLYCEMIA: ICD-10-CM

## 2023-11-30 DIAGNOSIS — R20.2 NUMBNESS AND TINGLING: ICD-10-CM

## 2023-11-30 DIAGNOSIS — R20.0 NUMBNESS AND TINGLING: ICD-10-CM

## 2023-11-30 DIAGNOSIS — R41.9 COGNITIVE COMPLAINTS WITH NORMAL EXAM: Primary | ICD-10-CM

## 2023-11-30 PROCEDURE — G8427 DOCREV CUR MEDS BY ELIG CLIN: HCPCS | Performed by: NURSE PRACTITIONER

## 2023-11-30 PROCEDURE — G8420 CALC BMI NORM PARAMETERS: HCPCS | Performed by: NURSE PRACTITIONER

## 2023-11-30 PROCEDURE — G8400 PT W/DXA NO RESULTS DOC: HCPCS | Performed by: NURSE PRACTITIONER

## 2023-11-30 PROCEDURE — 1123F ACP DISCUSS/DSCN MKR DOCD: CPT | Performed by: NURSE PRACTITIONER

## 2023-11-30 PROCEDURE — 1090F PRES/ABSN URINE INCON ASSESS: CPT | Performed by: NURSE PRACTITIONER

## 2023-11-30 PROCEDURE — G8484 FLU IMMUNIZE NO ADMIN: HCPCS | Performed by: NURSE PRACTITIONER

## 2023-11-30 PROCEDURE — 99205 OFFICE O/P NEW HI 60 MIN: CPT | Performed by: NURSE PRACTITIONER

## 2023-11-30 PROCEDURE — 1036F TOBACCO NON-USER: CPT | Performed by: NURSE PRACTITIONER

## 2023-11-30 NOTE — PATIENT INSTRUCTIONS
Patient instructions:  -neuropsychological evaluation: [facility] [phone] (Dr. Dru Soni)-- *consider therapy there.  (Call next week if you don't hear from them)  -labs (please obtain at lab) (also do labs from PCP) [facility] or [facility])  -MRI brain- central scheduling will call you  -EMG/nerve conduction study-- here or at orthopedics clinic

## 2023-11-30 NOTE — PROGRESS NOTES
[address]  [address] [facility]. [address] [place] [address]  Office:  [phone]  Fax: [phone]    Referring: Devonte Kulkarni MD      Chief Complaint   Patient presents with    Memory Loss    New Patient       HPI:  Cecily Bernal presents in new patient evaluation for memory loss. She has medical history to include hypertension, hyperlipidemia, and anxiety. She presents today in evaluation. She is with  and daughter. Daughter lives close by. Patient noticed she told someone something she already told them.  said she will tell you things 2-3 times. She wears hearing aids. Vision is impaired as well. She has macular degeneration and cataracts. Tomorrow has appointment to adjust hearing aids. 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 She does ADLs. Daughter recently had to take over meds for both of them, uses pill cases. Patient missed meds for 2 days last week in pill container (daughter noticed). Patient does finances. Daughter is handling appointments. Drives, but can't at night due to vision, just got license renewed. Not much misplacing items. Moved here from [facility] after 's stroke. They are from [place]. Endorses mood swings. Patient admits to getting pretty upset at times. Daughter tries to help with caregiving but patient wants to do it all. They've been  76 years. Very nervous/anxious per daughter. On Lexapro 20 mg. Not sure it's helping. Patient says she's depressed a lot. Worries a lot. They say she's a worry wort. Takes lorazepam 0.5 mg nightly. Has arthritis. Endorses headaches. Always had headaches. Takes aspirin often. Denies history of stroke, seizures, LOC spells, or head injury. Has had vertigo, hasn't had to take the medication in a long time. Reports neuropathy- pain and tingling in feet. Toes are numb. Uses Icyhot on thighs. Vanna Pimple and hurt knee (tripped over rake) but that improved.

## 2023-12-14 ENCOUNTER — HOSPITAL ENCOUNTER (OUTPATIENT)
Facility: HOSPITAL | Age: 83
Discharge: HOME | End: 2023-12-14
Payer: MEDICARE

## 2023-12-14 DIAGNOSIS — R41.9 COGNITIVE COMPLAINTS WITH NORMAL EXAM: ICD-10-CM

## 2023-12-14 PROCEDURE — 70551 MRI BRAIN STEM W/O DYE: CPT

## 2023-12-26 ENCOUNTER — APPOINTMENT (OUTPATIENT)
Facility: HOSPITAL | Age: 83
End: 2023-12-26
Payer: MEDICARE

## 2023-12-26 ENCOUNTER — HOSPITAL ENCOUNTER (EMERGENCY)
Facility: HOSPITAL | Age: 83
Discharge: HOME | End: 2023-12-26
Payer: MEDICARE

## 2023-12-26 VITALS
RESPIRATION RATE: 20 BRPM | OXYGEN SATURATION: 100 % | TEMPERATURE: 98 F | SYSTOLIC BLOOD PRESSURE: 170 MMHG | BODY MASS INDEX: 22.38 KG/M2 | WEIGHT: 114 LBS | HEIGHT: 60 IN | DIASTOLIC BLOOD PRESSURE: 98 MMHG | HEART RATE: 92 BPM

## 2023-12-26 DIAGNOSIS — R09.81 NASAL CONGESTION: ICD-10-CM

## 2023-12-26 DIAGNOSIS — R05.1 ACUTE COUGH: ICD-10-CM

## 2023-12-26 DIAGNOSIS — J00 ACUTE NASOPHARYNGITIS (COMMON COLD): Primary | ICD-10-CM

## 2023-12-26 LAB
FLUAV AG NPH QL IA: NEGATIVE
FLUBV AG NOSE QL IA: NEGATIVE
SARS-COV-2 RDRP RESP QL NAA+PROBE: NOT DETECTED
SOURCE: NORMAL

## 2023-12-26 PROCEDURE — 87635 SARS-COV-2 COVID-19 AMP PRB: CPT

## 2023-12-26 PROCEDURE — 99284 EMERGENCY DEPT VISIT MOD MDM: CPT

## 2023-12-26 PROCEDURE — 71046 X-RAY EXAM CHEST 2 VIEWS: CPT

## 2023-12-26 PROCEDURE — 87804 INFLUENZA ASSAY W/OPTIC: CPT

## 2023-12-26 RX ORDER — FLUTICASONE PROPIONATE 50 MCG
1 SPRAY, SUSPENSION (ML) NASAL DAILY
Qty: 16 G | Refills: 0 | Status: SHIPPED | OUTPATIENT
Start: 2023-12-26 | End: 2024-01-05

## 2023-12-26 RX ORDER — GUAIFENESIN 600 MG/1
600 TABLET, EXTENDED RELEASE ORAL 2 TIMES DAILY
Qty: 20 TABLET | Refills: 0 | Status: SHIPPED | OUTPATIENT
Start: 2023-12-26 | End: 2024-01-05

## 2023-12-26 NOTE — DISCHARGE INSTRUCTIONS
Push fluids, use a vaporizer, use Tylenol or OTC NSAID's (Advil, Alleve etc) for fever or achiness, OTC cough suppressant/decongestants such as Robitussin CF and rest.   Take medication as prescribed. Follow-up with your primary care physician within 2 days for reassessment. Bring the results from this visit with you for their review. Return to the ED immediately for any new, worsening, or persistent symptoms, including fever, vomiting, chest pain, shortness of breath, or any other medical concerns.

## 2024-01-03 DIAGNOSIS — M54.50 CHRONIC BILATERAL LOW BACK PAIN, UNSPECIFIED WHETHER SCIATICA PRESENT: ICD-10-CM

## 2024-01-03 DIAGNOSIS — G89.29 CHRONIC BILATERAL LOW BACK PAIN, UNSPECIFIED WHETHER SCIATICA PRESENT: ICD-10-CM

## 2024-01-03 RX ORDER — MELOXICAM 7.5 MG/1
TABLET ORAL
Qty: 90 TABLET | Refills: 1 | Status: SHIPPED | OUTPATIENT
Start: 2024-01-03

## 2024-01-04 ENCOUNTER — TELEPHONE (OUTPATIENT)
Age: 84
End: 2024-01-04

## 2024-01-04 NOTE — TELEPHONE ENCOUNTER
Patient daughter called stating she received a call yesterday to call office back. Assuming it was regarding her results.

## 2024-01-08 ENCOUNTER — OFFICE VISIT (OUTPATIENT)
Age: 84
End: 2024-01-08
Payer: MEDICARE

## 2024-01-08 VITALS
RESPIRATION RATE: 18 BRPM | OXYGEN SATURATION: 95 % | HEART RATE: 88 BPM | BODY MASS INDEX: 22.58 KG/M2 | TEMPERATURE: 97.4 F | WEIGHT: 115 LBS | DIASTOLIC BLOOD PRESSURE: 51 MMHG | SYSTOLIC BLOOD PRESSURE: 94 MMHG | HEIGHT: 60 IN

## 2024-01-08 VITALS
HEIGHT: 60 IN | HEART RATE: 99 BPM | RESPIRATION RATE: 22 BRPM | DIASTOLIC BLOOD PRESSURE: 66 MMHG | SYSTOLIC BLOOD PRESSURE: 114 MMHG | OXYGEN SATURATION: 97 % | WEIGHT: 115.6 LBS | BODY MASS INDEX: 22.7 KG/M2

## 2024-01-08 DIAGNOSIS — R05.1 ACUTE COUGH: Primary | ICD-10-CM

## 2024-01-08 DIAGNOSIS — R20.2 NUMBNESS AND TINGLING: ICD-10-CM

## 2024-01-08 DIAGNOSIS — R41.9 COGNITIVE COMPLAINTS: Primary | ICD-10-CM

## 2024-01-08 DIAGNOSIS — R20.0 NUMBNESS AND TINGLING: ICD-10-CM

## 2024-01-08 PROCEDURE — 1090F PRES/ABSN URINE INCON ASSESS: CPT | Performed by: INTERNAL MEDICINE

## 2024-01-08 PROCEDURE — 1123F ACP DISCUSS/DSCN MKR DOCD: CPT | Performed by: INTERNAL MEDICINE

## 2024-01-08 PROCEDURE — G8427 DOCREV CUR MEDS BY ELIG CLIN: HCPCS | Performed by: NURSE PRACTITIONER

## 2024-01-08 PROCEDURE — 1036F TOBACCO NON-USER: CPT | Performed by: INTERNAL MEDICINE

## 2024-01-08 PROCEDURE — G8420 CALC BMI NORM PARAMETERS: HCPCS | Performed by: INTERNAL MEDICINE

## 2024-01-08 PROCEDURE — 1090F PRES/ABSN URINE INCON ASSESS: CPT | Performed by: NURSE PRACTITIONER

## 2024-01-08 PROCEDURE — 1036F TOBACCO NON-USER: CPT | Performed by: NURSE PRACTITIONER

## 2024-01-08 PROCEDURE — 1123F ACP DISCUSS/DSCN MKR DOCD: CPT | Performed by: NURSE PRACTITIONER

## 2024-01-08 PROCEDURE — G8427 DOCREV CUR MEDS BY ELIG CLIN: HCPCS | Performed by: INTERNAL MEDICINE

## 2024-01-08 PROCEDURE — 99214 OFFICE O/P EST MOD 30 MIN: CPT | Performed by: INTERNAL MEDICINE

## 2024-01-08 PROCEDURE — G8484 FLU IMMUNIZE NO ADMIN: HCPCS | Performed by: INTERNAL MEDICINE

## 2024-01-08 PROCEDURE — 99213 OFFICE O/P EST LOW 20 MIN: CPT | Performed by: NURSE PRACTITIONER

## 2024-01-08 PROCEDURE — G8420 CALC BMI NORM PARAMETERS: HCPCS | Performed by: NURSE PRACTITIONER

## 2024-01-08 PROCEDURE — G8400 PT W/DXA NO RESULTS DOC: HCPCS | Performed by: NURSE PRACTITIONER

## 2024-01-08 PROCEDURE — G8484 FLU IMMUNIZE NO ADMIN: HCPCS | Performed by: NURSE PRACTITIONER

## 2024-01-08 PROCEDURE — 99213 OFFICE O/P EST LOW 20 MIN: CPT

## 2024-01-08 PROCEDURE — G8400 PT W/DXA NO RESULTS DOC: HCPCS | Performed by: INTERNAL MEDICINE

## 2024-01-08 RX ORDER — CODEINE PHOSPHATE/GUAIFENESIN 10-100MG/5
5 LIQUID (ML) ORAL 3 TIMES DAILY PRN
Qty: 100 ML | Refills: 0 | Status: SHIPPED | OUTPATIENT
Start: 2024-01-08 | End: 2024-01-15

## 2024-01-08 ASSESSMENT — PATIENT HEALTH QUESTIONNAIRE - PHQ9
SUM OF ALL RESPONSES TO PHQ QUESTIONS 1-9: 0
1. LITTLE INTEREST OR PLEASURE IN DOING THINGS: 0
SUM OF ALL RESPONSES TO PHQ QUESTIONS 1-9: 0
SUM OF ALL RESPONSES TO PHQ QUESTIONS 1-9: 0
SUM OF ALL RESPONSES TO PHQ9 QUESTIONS 1 & 2: 0
SUM OF ALL RESPONSES TO PHQ QUESTIONS 1-9: 0
2. FEELING DOWN, DEPRESSED OR HOPELESS: 0

## 2024-01-08 ASSESSMENT — ENCOUNTER SYMPTOMS
SHORTNESS OF BREATH: 0
DIARRHEA: 1
COUGH: 1
SINUS PRESSURE: 0
NAUSEA: 0
VOMITING: 0
WHEEZING: 0

## 2024-01-08 NOTE — PATIENT INSTRUCTIONS
Patient instructions:  -you had a prescription from the ED for: fluticasone (FLONASE) 50 MCG/ACT nasal spray   -labwork from me and PCP  -neuropsychological evaluation: [facility] [phone] (Dr. Heike Odell)-- *consider therapy there. (Call next week if you don't hear from them)  -EMG/nerve conduction study-- here or at orthopedics clinic

## 2024-01-08 NOTE — PROGRESS NOTES
Carol M Minnie presents today for   Chief Complaint   Patient presents with    Cough                 1. \"Have you been to the ER, urgent care clinic since your last visit? 12- [facility] Hospitalized since your last visit?\" no    2. \"Have you seen or consulted any other health care providers outside of the [facility] since your last visit?\" no     3. For patients aged 45-75: Has the patient had a colonoscopy / FIT/ Cologuard? NA - based on age      If the patient is female:    4. For patients aged 40-74: Has the patient had a mammogram within the past 2 years? NA - based on age or sex      5. For patients aged 21-65: Has the patient had a pap smear? NA - based on age or sex
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
## 2024-01-08 NOTE — PROGRESS NOTES
Suggested it would be good to get away from the house such as to go to the gym to exercise in a class or Silver Sneakers.  She reports it is hard on her to have to be at home to take care of her  all the time.  Recommended to obtain the Flonase prescribed by the ED.  Follow up in 6 months or after neuropsychological evaluation.    Carol was seen today for results.    Diagnoses and all orders for this visit:    Cognitive complaints    Numbness and tingling      Total time 28 minutes with 15 minutes spent in counseling.     Signed By: DANNIE FUENTES NP        PLEASE NOTE:   Portions of this document may have been produced using voice recognition software. Unrecognized errors in transcription may be present.

## 2024-01-11 ENCOUNTER — TELEPHONE (OUTPATIENT)
Age: 84
End: 2024-01-11

## 2024-01-11 DIAGNOSIS — J20.9 ACUTE BRONCHITIS, UNSPECIFIED ORGANISM: Primary | ICD-10-CM

## 2024-01-11 RX ORDER — AZITHROMYCIN 250 MG/1
TABLET, FILM COATED ORAL
Qty: 6 TABLET | Refills: 0 | Status: SHIPPED | OUTPATIENT
Start: 2024-01-11

## 2024-01-11 NOTE — TELEPHONE ENCOUNTER
Patient daughter walk in and stated that her mom was seen last week cough, but now states that her mom is having clear like mucus and would like an medication sent to the pharmacy.Please Advise    [id number] - [place], [place] - [address] - P [phone] - F [phone] [[id number]]

## 2024-01-18 DIAGNOSIS — G89.29 CHRONIC BILATERAL LOW BACK PAIN, UNSPECIFIED WHETHER SCIATICA PRESENT: ICD-10-CM

## 2024-01-18 DIAGNOSIS — M54.50 CHRONIC BILATERAL LOW BACK PAIN, UNSPECIFIED WHETHER SCIATICA PRESENT: ICD-10-CM

## 2024-01-18 RX ORDER — MELOXICAM 7.5 MG/1
TABLET ORAL
Qty: 90 TABLET | Refills: 1 | Status: SHIPPED | OUTPATIENT
Start: 2024-01-18

## 2024-01-24 ENCOUNTER — TELEPHONE (OUTPATIENT)
Age: 84
End: 2024-01-24

## 2024-01-29 DIAGNOSIS — F41.1 GENERALIZED ANXIETY DISORDER: ICD-10-CM

## 2024-01-29 RX ORDER — LORAZEPAM 0.5 MG/1
TABLET ORAL
Qty: 90 TABLET | Refills: 0 | Status: SHIPPED | OUTPATIENT
Start: 2024-01-29 | End: 2024-07-20

## 2024-01-29 NOTE — TELEPHONE ENCOUNTER
[place]  [place] 1/29/2024    The last fill date for Lorazepam 0.5 Mg Tablet  was 10/18/2023 for a 90 d/s qty 90      Last UDS: not on file     CSA Last signed: not on file         PCP: Sunshine Rico MD      Future Appointments   Date Time Provider Department Center   4/24/2024  2:00 PM Debby Chadwick MD [facility] BS AMB   5/16/2024 11:00 AM IO LAB VISIT [facility] BS AMB   5/24/2024  1:40 PM Sunshine Rico MD [facility] BS AMB   7/10/2024  9:00 AM Gissel Rebollar APRN - NP [facility] BS AMB

## 2024-01-31 RX ORDER — LORAZEPAM 0.5 MG/1
TABLET ORAL
Qty: 90 TABLET | Refills: 0 | OUTPATIENT
Start: 2024-01-31 | End: 2024-07-20

## 2024-02-06 DIAGNOSIS — F41.1 GENERALIZED ANXIETY DISORDER: ICD-10-CM

## 2024-02-06 RX ORDER — ESCITALOPRAM OXALATE 20 MG/1
20 TABLET ORAL DAILY
Qty: 90 TABLET | Refills: 1 | Status: SHIPPED | OUTPATIENT
Start: 2024-02-06

## 2024-03-18 ENCOUNTER — TELEPHONE (OUTPATIENT)
Age: 84
End: 2024-03-18

## 2024-03-18 RX ORDER — PANTOPRAZOLE SODIUM 20 MG/1
20 TABLET, DELAYED RELEASE ORAL DAILY
Qty: 90 TABLET | Refills: 2 | Status: SHIPPED | OUTPATIENT
Start: 2024-03-18

## 2024-03-18 NOTE — TELEPHONE ENCOUNTER
Refill req     pantoprazole (PROTONIX) 20 MG tablet      Pharmacy   [id number] - [place], [place] - [address] - P [phone] - F [phone] [[id number]]

## 2024-05-01 ENCOUNTER — TELEPHONE (OUTPATIENT)
Age: 84
End: 2024-05-01

## 2024-05-01 DIAGNOSIS — E78.00 PURE HYPERCHOLESTEROLEMIA, UNSPECIFIED: ICD-10-CM

## 2024-05-01 DIAGNOSIS — F41.1 GENERALIZED ANXIETY DISORDER: ICD-10-CM

## 2024-05-01 RX ORDER — ATORVASTATIN CALCIUM 40 MG/1
40 TABLET, FILM COATED ORAL DAILY
Qty: 90 TABLET | Refills: 3 | Status: SHIPPED | OUTPATIENT
Start: 2024-05-01

## 2024-05-01 RX ORDER — LORAZEPAM 0.5 MG/1
TABLET ORAL
Qty: 90 TABLET | Refills: 0 | Status: SHIPPED | OUTPATIENT
Start: 2024-05-01 | End: 2024-10-21

## 2024-05-01 NOTE — TELEPHONE ENCOUNTER
Refill req  LORazepam (ATIVAN) 0.5 MG tablet     atorvastatin (LIPITOR) 40 MG tablet       Pharmacy   [id number] [address] [place] [address] W - P [phone] - F [phone] [[id number]]

## 2024-05-13 ENCOUNTER — TELEPHONE (OUTPATIENT)
Age: 84
End: 2024-05-13

## 2024-05-15 ENCOUNTER — HOSPITAL ENCOUNTER (OUTPATIENT)
Facility: HOSPITAL | Age: 84
Setting detail: SPECIMEN
Discharge: HOME | End: 2024-05-18
Payer: MEDICARE

## 2024-05-15 DIAGNOSIS — G98.8 NEUROLOGIC DISORDER: ICD-10-CM

## 2024-05-15 LAB
FOLATE SERPL-MCNC: >20 NG/ML (ref 3.1–17.5)
VIT B12 SERPL-MCNC: 540 PG/ML (ref 211–911)

## 2024-05-15 PROCEDURE — 82746 ASSAY OF FOLIC ACID SERUM: CPT

## 2024-05-15 PROCEDURE — 36415 COLL VENOUS BLD VENIPUNCTURE: CPT

## 2024-05-15 PROCEDURE — 83921 ORGANIC ACID SINGLE QUANT: CPT

## 2024-05-15 PROCEDURE — 84165 PROTEIN E-PHORESIS SERUM: CPT

## 2024-05-15 PROCEDURE — 82607 VITAMIN B-12: CPT

## 2024-05-15 PROCEDURE — 84155 ASSAY OF PROTEIN SERUM: CPT

## 2024-05-17 LAB
ALBUMIN SERPL ELPH-MCNC: 4 G/DL (ref 2.9–4.4)
ALBUMIN/GLOB SERPL: 1.4 (ref 0.7–1.7)
ALPHA1 GLOB SERPL ELPH-MCNC: 0.3 G/DL (ref 0–0.4)
ALPHA2 GLOB SERPL ELPH-MCNC: 0.8 G/DL (ref 0.4–1)
B-GLOBULIN SERPL ELPH-MCNC: 1 G/DL (ref 0.7–1.3)
GAMMA GLOB SERPL ELPH-MCNC: 0.8 G/DL (ref 0.4–1.8)
GLOBULIN SER CALC-MCNC: 2.8 G/DL (ref 2.2–3.9)
M PROTEIN SERPL ELPH-MCNC: NORMAL G/DL
PROT SERPL-MCNC: 6.8 G/DL (ref 6–8.5)

## 2024-05-20 LAB — METHYLMALONATE SERPL-SCNC: 232 NMOL/L (ref 0–378)

## 2024-05-24 ENCOUNTER — OFFICE VISIT (OUTPATIENT)
Age: 84
End: 2024-05-24
Payer: MEDICARE

## 2024-05-24 VITALS
RESPIRATION RATE: 18 BRPM | DIASTOLIC BLOOD PRESSURE: 56 MMHG | SYSTOLIC BLOOD PRESSURE: 96 MMHG | BODY MASS INDEX: 21.4 KG/M2 | OXYGEN SATURATION: 98 % | HEART RATE: 91 BPM | HEIGHT: 60 IN | WEIGHT: 109 LBS | TEMPERATURE: 98.5 F

## 2024-05-24 DIAGNOSIS — I10 ESSENTIAL (PRIMARY) HYPERTENSION: ICD-10-CM

## 2024-05-24 DIAGNOSIS — Z00.00 MEDICARE ANNUAL WELLNESS VISIT, SUBSEQUENT: Primary | ICD-10-CM

## 2024-05-24 DIAGNOSIS — F41.1 GENERALIZED ANXIETY DISORDER: ICD-10-CM

## 2024-05-24 DIAGNOSIS — G89.29 CHRONIC LOW BACK PAIN, UNSPECIFIED BACK PAIN LATERALITY, UNSPECIFIED WHETHER SCIATICA PRESENT: ICD-10-CM

## 2024-05-24 DIAGNOSIS — M54.50 CHRONIC LOW BACK PAIN, UNSPECIFIED BACK PAIN LATERALITY, UNSPECIFIED WHETHER SCIATICA PRESENT: ICD-10-CM

## 2024-05-24 DIAGNOSIS — Z51.81 THERAPEUTIC DRUG MONITORING: ICD-10-CM

## 2024-05-24 DIAGNOSIS — E78.00 PURE HYPERCHOLESTEROLEMIA, UNSPECIFIED: ICD-10-CM

## 2024-05-24 PROCEDURE — G0439 PPPS, SUBSEQ VISIT: HCPCS | Performed by: INTERNAL MEDICINE

## 2024-05-24 PROCEDURE — 1036F TOBACCO NON-USER: CPT | Performed by: INTERNAL MEDICINE

## 2024-05-24 PROCEDURE — 1123F ACP DISCUSS/DSCN MKR DOCD: CPT | Performed by: INTERNAL MEDICINE

## 2024-05-24 PROCEDURE — 1090F PRES/ABSN URINE INCON ASSESS: CPT | Performed by: INTERNAL MEDICINE

## 2024-05-24 PROCEDURE — 99497 ADVNCD CARE PLAN 30 MIN: CPT | Performed by: INTERNAL MEDICINE

## 2024-05-24 PROCEDURE — 99214 OFFICE O/P EST MOD 30 MIN: CPT | Performed by: INTERNAL MEDICINE

## 2024-05-24 PROCEDURE — G8420 CALC BMI NORM PARAMETERS: HCPCS | Performed by: INTERNAL MEDICINE

## 2024-05-24 PROCEDURE — G8427 DOCREV CUR MEDS BY ELIG CLIN: HCPCS | Performed by: INTERNAL MEDICINE

## 2024-05-24 RX ORDER — LIDOCAINE 50 MG/G
PATCH TOPICAL
Qty: 60 PATCH | Refills: 5 | Status: SHIPPED | OUTPATIENT
Start: 2024-05-24

## 2024-05-24 SDOH — ECONOMIC STABILITY: FOOD INSECURITY: WITHIN THE PAST 12 MONTHS, THE FOOD YOU BOUGHT JUST DIDN'T LAST AND YOU DIDN'T HAVE MONEY TO GET MORE.: NEVER TRUE

## 2024-05-24 SDOH — ECONOMIC STABILITY: FOOD INSECURITY: WITHIN THE PAST 12 MONTHS, YOU WORRIED THAT YOUR FOOD WOULD RUN OUT BEFORE YOU GOT MONEY TO BUY MORE.: NEVER TRUE

## 2024-05-24 SDOH — ECONOMIC STABILITY: INCOME INSECURITY: HOW HARD IS IT FOR YOU TO PAY FOR THE VERY BASICS LIKE FOOD, HOUSING, MEDICAL CARE, AND HEATING?: NOT HARD AT ALL

## 2024-05-24 ASSESSMENT — PATIENT HEALTH QUESTIONNAIRE - PHQ9
SUM OF ALL RESPONSES TO PHQ QUESTIONS 1-9: 0
8. MOVING OR SPEAKING SO SLOWLY THAT OTHER PEOPLE COULD HAVE NOTICED. OR THE OPPOSITE, BEING SO FIGETY OR RESTLESS THAT YOU HAVE BEEN MOVING AROUND A LOT MORE THAN USUAL: NOT AT ALL
SUM OF ALL RESPONSES TO PHQ QUESTIONS 1-9: 0
10. IF YOU CHECKED OFF ANY PROBLEMS, HOW DIFFICULT HAVE THESE PROBLEMS MADE IT FOR YOU TO DO YOUR WORK, TAKE CARE OF THINGS AT HOME, OR GET ALONG WITH OTHER PEOPLE: NOT DIFFICULT AT ALL
4. FEELING TIRED OR HAVING LITTLE ENERGY: NOT AT ALL
3. TROUBLE FALLING OR STAYING ASLEEP: NOT AT ALL
9. THOUGHTS THAT YOU WOULD BE BETTER OFF DEAD, OR OF HURTING YOURSELF: NOT AT ALL
1. LITTLE INTEREST OR PLEASURE IN DOING THINGS: NOT AT ALL
7. TROUBLE CONCENTRATING ON THINGS, SUCH AS READING THE NEWSPAPER OR WATCHING TELEVISION: NOT AT ALL
SUM OF ALL RESPONSES TO PHQ9 QUESTIONS 1 & 2: 0
6. FEELING BAD ABOUT YOURSELF - OR THAT YOU ARE A FAILURE OR HAVE LET YOURSELF OR YOUR FAMILY DOWN: NOT AT ALL
SUM OF ALL RESPONSES TO PHQ QUESTIONS 1-9: 0
2. FEELING DOWN, DEPRESSED OR HOPELESS: NOT AT ALL
SUM OF ALL RESPONSES TO PHQ QUESTIONS 1-9: 0
5. POOR APPETITE OR OVEREATING: NOT AT ALL

## 2024-05-24 ASSESSMENT — LIFESTYLE VARIABLES
HOW MANY STANDARD DRINKS CONTAINING ALCOHOL DO YOU HAVE ON A TYPICAL DAY: 1 OR 2
HOW OFTEN DO YOU HAVE A DRINK CONTAINING ALCOHOL: MONTHLY OR LESS

## 2024-05-24 NOTE — PATIENT INSTRUCTIONS
Learning About Being Active as an Older Adult  Why is being active important as you get older?     Being active is one of the best things you can do for your health. And it's never too late to start. Being active--or getting active, if you aren't already--has definite benefits. It can:  Give you more energy,  Keep your mind sharp.  Improve balance to reduce your risk of falls.  Help you manage chronic illness with fewer medicines.  No matter how old you are, how fit you are, or what health problems you have, there is a form of activity that will work for you. And the more physical activity you can do, the better your overall health will be.  What kinds of activity can help you stay healthy?  Being more active will make your daily activities easier. Physical activity includes planned exercise and things you do in daily life. There are four types of activity:  Aerobic.  Doing aerobic activity makes your heart and lungs strong.  Includes walking, dancing, and gardening.  Aim for at least 2½ hours spread throughout the week.  It improves your energy and can help you sleep better.  Muscle-strengthening.  This type of activity can help maintain muscle and strengthen bones.  Includes climbing stairs, using resistance bands, and lifting or carrying heavy loads.  Aim for at least twice a week.  It can help protect the knees and other joints.  Stretching.  Stretching gives you better range of motion in joints and muscles.  Includes upper arm stretches, calf stretches, and gentle yoga.  Aim for at least twice a week, preferably after your muscles are warmed up from other activities.  It can help you function better in daily life.  Balancing.  This helps you stay coordinated and have good posture.  Includes heel-to-toe walking, scooter chi, and certain types of yoga.  Aim for at least 3 days a week.  It can reduce your risk of falling.  Even if you have a hard time meeting the recommendations, it's better to be more active

## 2024-05-24 NOTE — PROGRESS NOTES
Carol Hartman presents today for medicare wellness.              1. \"Have you been to the ER, urgent care clinic since your last visit?  Hospitalized since your last visit?\" no    2. \"Have you seen or consulted any other health care providers outside of the [facility] since your last visit?\" no     3. For patients aged 45-75: Has the patient had a colonoscopy / FIT/ Cologuard? NA - based on age      If the patient is female:    4. For patients aged 40-74: Has the patient had a mammogram within the past 2 years? NA - based on age or sex      5. For patients aged 21-65: Has the patient had a pap smear? Hysterectomy.  
Known Allergies  Prior to Visit Medications    Medication Sig Taking? Authorizing Provider   atorvastatin (LIPITOR) 40 MG tablet Take 1 tablet by mouth daily Yes Sunshine Rico MD   pantoprazole (PROTONIX) 20 MG tablet Take 1 tablet by mouth daily Yes Sunshine Rico MD   escitalopram (LEXAPRO) 20 MG tablet Take 1 tablet by mouth daily Yes Sunshine Rico MD   meloxicam (MOBIC) 7.5 MG tablet 1 p.o. daily as needed pain.  Take all doses with food.  Continue Protonix. Yes Sunshine Rico MD   ASPIRIN 81 PO Take by mouth Yes ProviderBernardo MD   losartan (COZAAR) 50 MG tablet Take 1 tablet by mouth daily Yes Sunshine Rico MD   Multiple Vitamin (ANTIOXIDANT A/C/E PO) Take by mouth Yes Provider, MD Bernardo   Calcium Carbonate-Vitamin D 600-3.125 MG-MCG TABS Take 1 tablet by mouth 2 times daily Yes Automatic Reconciliation, Ar   lidocaine (LIDODERM) 5 % APPLY 1 TO 3 PATCHES TOPICALLY TO THE SKIN EVERY DAY. MAY WEAR UP TO 12 HOURS. REMOVE PATCHES FOR 12 HOURS Yes Automatic Reconciliation, Ar   LORazepam (ATIVAN) 0.5 MG tablet 1 p.o. nightly as needed  Patient not taking: Reported on 5/24/2024  Sunshine Rico MD   fluticasone (FLONASE) 50 MCG/ACT nasal spray 1 spray by Nasal route daily for 10 days  Hamida Nicolas APRN - NP       CareTeam (Including outside providers/suppliers regularly involved in providing care):   Patient Care Team:  Sunshine Rico MD as PCP - General  Sunshine Rico MD as PCP - Empaneled Provider     Reviewed and updated this visit:  Tobacco  Allergies  Meds  Med Hx  Surg Hx  Soc Hx  Fam Hx

## 2024-05-25 ENCOUNTER — TELEPHONE (OUTPATIENT)
Age: 84
End: 2024-05-25

## 2024-05-25 PROBLEM — I10 ESSENTIAL (PRIMARY) HYPERTENSION: Status: ACTIVE | Noted: 2024-05-25

## 2024-05-25 PROBLEM — M54.50 CHRONIC LOW BACK PAIN: Status: ACTIVE | Noted: 2024-05-25

## 2024-05-25 PROBLEM — Z51.81 THERAPEUTIC DRUG MONITORING: Status: ACTIVE | Noted: 2023-05-17

## 2024-05-25 PROBLEM — F33.9 MAJOR DEPRESSIVE DISORDER, RECURRENT, UNSPECIFIED (HCC): Status: RESOLVED | Noted: 2023-05-15 | Resolved: 2024-05-25

## 2024-05-25 PROBLEM — F41.1 GENERALIZED ANXIETY DISORDER: Status: ACTIVE | Noted: 2024-05-25

## 2024-05-25 PROBLEM — G89.29 CHRONIC LOW BACK PAIN: Status: ACTIVE | Noted: 2024-05-25

## 2024-05-25 ASSESSMENT — ENCOUNTER SYMPTOMS
BLOOD IN STOOL: 0
SHORTNESS OF BREATH: 0
BACK PAIN: 1

## 2024-05-25 NOTE — TELEPHONE ENCOUNTER
She will need fasting labs shortly before her November visit with me, please schedule for the week before

## 2024-05-25 NOTE — ACP (ADVANCE CARE PLANNING)
Advance Care Planning     Advance Care Planning (ACP) Physician/NP/PA Conversation    Date of Conversation: 5/24/2024  Conducted with: Patient with Decision Making Capacity    Healthcare Decision Maker:        Click here to complete Healthcare Decision Makers including selection of the Healthcare Decision Maker Relationship (ie \"Primary\")      Care Preferences:    Hospitalization:  \"If your health worsens and it becomes clear that your chance of recovery is unlikely, what would be your preference regarding hospitalization?\"  The patient would prefer comfort-focused treatment without hospitalization.    Ventilation:  \"If you were unable to breath on your own and your chance of recovery was unlikely, what would be your preference about the use of a ventilator (breathing machine) if it was available to you?\"  The patient would NOT desire the use of a ventilator.    Resuscitation:  \"In the event your heart stopped as a result of an underlying serious health condition, would you want attempts made to restart your heart, or would you prefer a natural death?\"  Yes, attempt to resuscitate.    artificial nutrition no    Conversation Outcomes / Follow-Up Plan:  ACP complete - no further action today  Reviewed DNR/DNI and patient elects Full Code (Attempt Resuscitation)    Length of Voluntary ACP Conversation in minutes:  16 minutes    Sunshine Rico MD

## 2024-07-10 ENCOUNTER — OFFICE VISIT (OUTPATIENT)
Age: 84
End: 2024-07-10
Payer: MEDICARE

## 2024-07-10 VITALS
TEMPERATURE: 97.6 F | HEIGHT: 60 IN | RESPIRATION RATE: 16 BRPM | OXYGEN SATURATION: 99 % | WEIGHT: 112.2 LBS | HEART RATE: 82 BPM | BODY MASS INDEX: 22.03 KG/M2 | SYSTOLIC BLOOD PRESSURE: 122 MMHG | DIASTOLIC BLOOD PRESSURE: 84 MMHG

## 2024-07-10 DIAGNOSIS — R41.9 COGNITIVE COMPLAINTS: Primary | ICD-10-CM

## 2024-07-10 PROCEDURE — G8427 DOCREV CUR MEDS BY ELIG CLIN: HCPCS | Performed by: NURSE PRACTITIONER

## 2024-07-10 PROCEDURE — G8420 CALC BMI NORM PARAMETERS: HCPCS | Performed by: NURSE PRACTITIONER

## 2024-07-10 PROCEDURE — 1123F ACP DISCUSS/DSCN MKR DOCD: CPT | Performed by: NURSE PRACTITIONER

## 2024-07-10 PROCEDURE — 99213 OFFICE O/P EST LOW 20 MIN: CPT | Performed by: NURSE PRACTITIONER

## 2024-07-10 PROCEDURE — 1036F TOBACCO NON-USER: CPT | Performed by: NURSE PRACTITIONER

## 2024-07-10 PROCEDURE — 1090F PRES/ABSN URINE INCON ASSESS: CPT | Performed by: NURSE PRACTITIONER

## 2024-07-10 NOTE — PROGRESS NOTES
[facility]  [address]. [address], [place] [address]  Office:  [phone]  Fax: [phone]  Chief Complaint   Patient presents with    Follow-up     F/U cognitive complaints       HPI: Carol Hartman presents today in follow-up.  She was last seen here on 1/8/2024.  At that time we discussed the results of testing which included MRI of the brain which showed mild to moderate generalized atrophy as well as mild burden chronic small vessel disease.  Her lab workup was pending.  Neuropsychology evaluation was pending.  Encouraged to take breaks from caregiving duties and exercise.  EMG/NCS was pending from prior visit due to signs of neuropathy.    She underwent neuropsychological evaluation at [facility] with Dr. Odell.  In person testing evaluation was on 5/8/2024.  It was noted that she had an in person review of results on 5/28/2024.  Per report, it was noted that the results however above the range of MCI status and do not meet criteria for dementia secondary to lack of global impairment in any single cognitive domain and mental status findings.  However, mild variability in functional status findings and patient report of some supports needed and complex ADLs are notable.  It was noted that etiology may be multifactorial secondary to 1) severe and chronic stress involving features of anxiety and depression in the setting of caregiver burden, 2) related to #1, sleep disruption involving the use of a benzodiazepine, 3) also related to #1, alcohol overuse likely as a means of coping, 4) notable sensory disruption involving severely diminished vision plus hearing and daily headaches, and 5) additional chronic pain in the setting of arthritis.  It was noted that brief serial neuropsychological assessment could be conducted in 1 to 2 years to better inform etiology over time as needed.    Labs 5/15/2024: vitamin B12 540, folate >20.      She missed her EMG

## 2024-07-10 NOTE — PATIENT INSTRUCTIONS
Patient instructions:  -please call [facility]- [phone] to receive the neuropsychology evaluation report and recommendations    -try to limit/decrease the use of the lorazepam    -limit alcohol. No more than 1 drink per day.    -exercise-- yoga, consider Silver Sneakers    -increases assistance and oversight with complex activities of daily living such as finance management, and medication management    -consider therapy related to stress from caregiving    -consider a home aide to help with     -can consider neuropsychology re-eval at 1-2 years if needed

## 2024-08-07 DIAGNOSIS — F41.1 GENERALIZED ANXIETY DISORDER: ICD-10-CM

## 2024-08-07 RX ORDER — LORAZEPAM 0.5 MG/1
TABLET ORAL
Qty: 90 TABLET | Refills: 1 | Status: SHIPPED | OUTPATIENT
Start: 2024-08-07 | End: 2025-01-27

## 2024-08-07 NOTE — TELEPHONE ENCOUNTER
Refill req     LORazepam (ATIVAN) 0.5 MG tablet     Pharmacy   [id number] - [facility] [address] - P [phone] - F [phone] [[id number]]

## 2024-08-27 ENCOUNTER — TELEMEDICINE (OUTPATIENT)
Facility: CLINIC | Age: 84
End: 2024-08-27

## 2024-08-27 DIAGNOSIS — F41.1 GENERALIZED ANXIETY DISORDER: ICD-10-CM

## 2024-08-27 DIAGNOSIS — H91.93 BILATERAL HEARING LOSS, UNSPECIFIED HEARING LOSS TYPE: ICD-10-CM

## 2024-08-27 DIAGNOSIS — R41.89 COGNITIVE CHANGES: Primary | ICD-10-CM

## 2024-08-27 ASSESSMENT — PATIENT HEALTH QUESTIONNAIRE - PHQ9
SUM OF ALL RESPONSES TO PHQ QUESTIONS 1-9: 0
2. FEELING DOWN, DEPRESSED OR HOPELESS: NOT AT ALL
SUM OF ALL RESPONSES TO PHQ QUESTIONS 1-9: 0
1. LITTLE INTEREST OR PLEASURE IN DOING THINGS: NOT AT ALL
SUM OF ALL RESPONSES TO PHQ9 QUESTIONS 1 & 2: 0
SUM OF ALL RESPONSES TO PHQ QUESTIONS 1-9: 0
SUM OF ALL RESPONSES TO PHQ QUESTIONS 1-9: 0

## 2024-09-11 ASSESSMENT — ENCOUNTER SYMPTOMS: SHORTNESS OF BREATH: 0

## 2024-10-04 ENCOUNTER — TELEPHONE (OUTPATIENT)
Facility: CLINIC | Age: 84
End: 2024-10-04

## 2024-10-04 NOTE — TELEPHONE ENCOUNTER
Pt's child Taty Chaudhary called because pt was put on an antidepressant and pt is even more depressed since taking medication. She wants to know what can be done or if pt can be put on another medication. Please advise.

## 2024-10-14 ENCOUNTER — TELEPHONE (OUTPATIENT)
Facility: CLINIC | Age: 84
End: 2024-10-14

## 2024-10-14 NOTE — TELEPHONE ENCOUNTER
Pt daughter came into office wanting to speak with DR Rico concerning pt's depression please call pt daughter   Taty  [phone]

## 2024-10-15 ENCOUNTER — TELEPHONE (OUTPATIENT)
Facility: CLINIC | Age: 84
End: 2024-10-15

## 2024-10-15 DIAGNOSIS — F41.1 GENERALIZED ANXIETY DISORDER: Primary | ICD-10-CM

## 2024-10-15 NOTE — TELEPHONE ENCOUNTER
Pt requested a refill for LORazepam (ATIVAN) 0.5 MG tablet to be filled at [facility] on [address]. Please advise.

## 2024-10-16 ENCOUNTER — TELEPHONE (OUTPATIENT)
Facility: CLINIC | Age: 84
End: 2024-10-16

## 2024-10-16 RX ORDER — LORAZEPAM 0.5 MG/1
TABLET ORAL
Qty: 30 TABLET | Refills: 2 | Status: SHIPPED | OUTPATIENT
Start: 2024-10-16 | End: 2025-01-12

## 2024-10-16 NOTE — TELEPHONE ENCOUNTER
Let Taty know I am concerned about her mother using lorazepam if she is also drinking significant alcohol.  I will send a script for once daily from now on, hopefully the new antidepressant will help.

## 2024-10-16 NOTE — TELEPHONE ENCOUNTER
Daughter taty called and stated that about 3 weeks ago pt asked if she could have 1/2 a pill of Lorazepam during the day to help with her anxiety and take the \"edge\" off.   Pt continued taking an extra 1/2 pill every day until she ran out.   Taty didn't realize her mom was using the extra 1/2 pill every day. She has now taken control of the pill bottle to help manage how many pills pt is taking.     Pt is now out of medication. Taty is requesting a script with enough pills to get pt through to next refill on 11-7-2024     Taty stated she spoke to [employer] and they approved it.    number authorization phone number [phone]     [facility] #[id number] - [facility] [address]     Please review and Advise     Taty can be reached at [phone]

## 2024-11-19 ENCOUNTER — HOSPITAL ENCOUNTER (OUTPATIENT)
Facility: HOSPITAL | Age: 84
Setting detail: SPECIMEN
Discharge: HOME | End: 2024-11-22
Payer: MEDICARE

## 2024-11-19 DIAGNOSIS — E78.00 PURE HYPERCHOLESTEROLEMIA, UNSPECIFIED: ICD-10-CM

## 2024-11-19 DIAGNOSIS — Z51.81 THERAPEUTIC DRUG MONITORING: ICD-10-CM

## 2024-11-19 DIAGNOSIS — I10 ESSENTIAL (PRIMARY) HYPERTENSION: ICD-10-CM

## 2024-11-19 LAB
ALBUMIN SERPL-MCNC: 4.2 G/DL (ref 3.4–5)
ALBUMIN/GLOB SERPL: 1.4 (ref 0.8–1.7)
ALP SERPL-CCNC: 91 U/L (ref 45–117)
ALT SERPL-CCNC: 19 U/L (ref 13–56)
ANION GAP SERPL CALC-SCNC: 4 MMOL/L (ref 3–18)
APPEARANCE UR: CLEAR
AST SERPL-CCNC: 18 U/L (ref 10–38)
BACTERIA URNS QL MICRO: NEGATIVE /HPF
BASOPHILS # BLD: 0.1 K/UL (ref 0–0.1)
BASOPHILS NFR BLD: 1 % (ref 0–2)
BILIRUB SERPL-MCNC: 0.6 MG/DL (ref 0.2–1)
BILIRUB UR QL: NEGATIVE
BUN SERPL-MCNC: 22 MG/DL (ref 7–18)
BUN/CREAT SERPL: 21 (ref 12–20)
CALCIUM SERPL-MCNC: 10.1 MG/DL (ref 8.5–10.1)
CHLORIDE SERPL-SCNC: 99 MMOL/L (ref 100–111)
CHOLEST SERPL-MCNC: 226 MG/DL
CO2 SERPL-SCNC: 28 MMOL/L (ref 21–32)
COLOR UR: YELLOW
CREAT SERPL-MCNC: 1.03 MG/DL (ref 0.6–1.3)
DIFFERENTIAL METHOD BLD: ABNORMAL
EOSINOPHIL # BLD: 0.2 K/UL (ref 0–0.4)
EOSINOPHIL NFR BLD: 4 % (ref 0–5)
EPITH CASTS URNS QL MICRO: ABNORMAL /LPF (ref 0–5)
ERYTHROCYTE [DISTWIDTH] IN BLOOD BY AUTOMATED COUNT: 14.5 % (ref 11.6–14.5)
GLOBULIN SER CALC-MCNC: 2.9 G/DL (ref 2–4)
GLUCOSE SERPL-MCNC: 105 MG/DL (ref 74–99)
GLUCOSE UR STRIP.AUTO-MCNC: NEGATIVE MG/DL
HCT VFR BLD AUTO: 40.3 % (ref 35–45)
HDLC SERPL-MCNC: 69 MG/DL (ref 40–60)
HDLC SERPL: 3.3 (ref 0–5)
HGB BLD-MCNC: 13 G/DL (ref 12–16)
HGB UR QL STRIP: NEGATIVE
HYALINE CASTS URNS QL MICRO: ABNORMAL /LPF (ref 0–2)
IMM GRANULOCYTES # BLD AUTO: 0 K/UL (ref 0–0.04)
IMM GRANULOCYTES NFR BLD AUTO: 0 % (ref 0–0.5)
KETONES UR QL STRIP.AUTO: ABNORMAL MG/DL
LDLC SERPL CALC-MCNC: 140.4 MG/DL (ref 0–100)
LEUKOCYTE ESTERASE UR QL STRIP.AUTO: NEGATIVE
LIPID PANEL: ABNORMAL
LYMPHOCYTES # BLD: 0.9 K/UL (ref 0.9–3.6)
LYMPHOCYTES NFR BLD: 14 % (ref 21–52)
MCH RBC QN AUTO: 30.9 PG (ref 24–34)
MCHC RBC AUTO-ENTMCNC: 32.3 G/DL (ref 31–37)
MCV RBC AUTO: 95.7 FL (ref 78–100)
MONOCYTES # BLD: 0.6 K/UL (ref 0.05–1.2)
MONOCYTES NFR BLD: 9 % (ref 3–10)
MUCOUS THREADS URNS QL MICRO: ABNORMAL /LPF
NEUTS SEG # BLD: 4.6 K/UL (ref 1.8–8)
NEUTS SEG NFR BLD: 73 % (ref 40–73)
NITRITE UR QL STRIP.AUTO: NEGATIVE
NRBC # BLD: 0 K/UL (ref 0–0.01)
NRBC BLD-RTO: 0 PER 100 WBC
PH UR STRIP: 5.5 (ref 5–8)
PLATELET # BLD AUTO: 310 K/UL (ref 135–420)
PMV BLD AUTO: 11.1 FL (ref 9.2–11.8)
POTASSIUM SERPL-SCNC: 5.1 MMOL/L (ref 3.5–5.5)
PROT SERPL-MCNC: 7.1 G/DL (ref 6.4–8.2)
PROT UR STRIP-MCNC: NEGATIVE MG/DL
RBC # BLD AUTO: 4.21 M/UL (ref 4.2–5.3)
RBC #/AREA URNS HPF: ABNORMAL /HPF (ref 0–5)
SODIUM SERPL-SCNC: 131 MMOL/L (ref 136–145)
SP GR UR REFRACTOMETRY: 1.02 (ref 1–1.03)
TRIGL SERPL-MCNC: 83 MG/DL
UROBILINOGEN UR QL STRIP.AUTO: 0.2 EU/DL (ref 0.2–1)
VLDLC SERPL CALC-MCNC: 16.6 MG/DL
WBC # BLD AUTO: 6.3 K/UL (ref 4.6–13.2)
WBC URNS QL MICRO: ABNORMAL /HPF (ref 0–4)

## 2024-11-19 PROCEDURE — 80061 LIPID PANEL: CPT

## 2024-11-19 PROCEDURE — 81001 URINALYSIS AUTO W/SCOPE: CPT

## 2024-11-19 PROCEDURE — 85025 COMPLETE CBC W/AUTO DIFF WBC: CPT

## 2024-11-19 PROCEDURE — 36415 COLL VENOUS BLD VENIPUNCTURE: CPT

## 2024-11-19 PROCEDURE — 80053 COMPREHEN METABOLIC PANEL: CPT

## 2024-12-06 ENCOUNTER — TELEPHONE (OUTPATIENT)
Facility: CLINIC | Age: 84
End: 2024-12-06

## 2024-12-06 NOTE — TELEPHONE ENCOUNTER
Refill request via fax     Medication: meloxicam (MOBIC) 7.5 MG tablet   Quantity: 90  Pharmacy: [facility]   Last Fill: 1/18/2024      PCP: Sunshine Rico MD    LAST OFFICE VISIT: 8/27/2024      Future Appointments   Date Time Provider Department Center   1/13/2025  9:00 AM Gissel Rebollar APRN - NP [facility]

## 2024-12-06 NOTE — TELEPHONE ENCOUNTER
Pt's daughter Taty came into office to discuss pt's prescriptions. Pt needs the following rxs refilled: LORazepam (ATIVAN) 0.5 MG tablet,   losartan (COZAAR) 50 MG tablet atorvastatin (LIPITOR) 40 MG tablet, escitalopram (LEXAPRO) 20 MG tablet, pantoprazole (PROTONIX) 20 MG tablet to be sent to [facility] on [address]

## 2024-12-30 ENCOUNTER — TRANSCRIBE ORDERS (OUTPATIENT)
Facility: HOSPITAL | Age: 84
End: 2024-12-30

## 2024-12-30 DIAGNOSIS — Z12.31 VISIT FOR SCREENING MAMMOGRAM: Primary | ICD-10-CM

## 2024-12-31 ENCOUNTER — HOSPITAL ENCOUNTER (OUTPATIENT)
Facility: HOSPITAL | Age: 84
Discharge: HOME | End: 2025-01-03
Attending: INTERNAL MEDICINE
Payer: MEDICARE

## 2024-12-31 VITALS — HEIGHT: 60 IN | WEIGHT: 110 LBS | BODY MASS INDEX: 21.6 KG/M2

## 2024-12-31 DIAGNOSIS — Z12.31 VISIT FOR SCREENING MAMMOGRAM: ICD-10-CM

## 2024-12-31 PROCEDURE — 77063 BREAST TOMOSYNTHESIS BI: CPT

## 2025-01-10 ENCOUNTER — TELEPHONE (OUTPATIENT)
Facility: CLINIC | Age: 85
End: 2025-01-10

## 2025-01-10 NOTE — TELEPHONE ENCOUNTER
----- Message from Olga Marrufo PA-C sent at 1/9/2025  3:54 PM EST -----  Please contact patient regarding the below recommendations.     1) Your sodium level was low at 131. I see that it was also low at 133 on 11/8/23. It is possible this could be related to your medication escitalopram (brand is Lexapro), but I recommend this be further reviewed/investigated with your new PCP. If you are unable to see a new PCP in a timely manner, I can place a referral to nephrology to further investigate the low sodium level.     2) Your cholesterol looked better from previous but was still in an elevated range. I recommend you continue the atorvastatin 40 mg daily and review this with your new PCP.

## 2025-01-13 ENCOUNTER — OFFICE VISIT (OUTPATIENT)
Age: 85
End: 2025-01-13
Payer: MEDICARE

## 2025-01-13 VITALS
HEART RATE: 74 BPM | WEIGHT: 111 LBS | HEIGHT: 60 IN | OXYGEN SATURATION: 99 % | SYSTOLIC BLOOD PRESSURE: 140 MMHG | DIASTOLIC BLOOD PRESSURE: 79 MMHG | TEMPERATURE: 97.6 F | BODY MASS INDEX: 21.79 KG/M2

## 2025-01-13 DIAGNOSIS — R41.9 COGNITIVE COMPLAINTS: Primary | ICD-10-CM

## 2025-01-13 PROCEDURE — 1160F RVW MEDS BY RX/DR IN RCRD: CPT | Performed by: NURSE PRACTITIONER

## 2025-01-13 PROCEDURE — 1036F TOBACCO NON-USER: CPT | Performed by: NURSE PRACTITIONER

## 2025-01-13 PROCEDURE — G8427 DOCREV CUR MEDS BY ELIG CLIN: HCPCS | Performed by: NURSE PRACTITIONER

## 2025-01-13 PROCEDURE — 1123F ACP DISCUSS/DSCN MKR DOCD: CPT | Performed by: NURSE PRACTITIONER

## 2025-01-13 PROCEDURE — 99213 OFFICE O/P EST LOW 20 MIN: CPT | Performed by: NURSE PRACTITIONER

## 2025-01-13 PROCEDURE — 1126F AMNT PAIN NOTED NONE PRSNT: CPT | Performed by: NURSE PRACTITIONER

## 2025-01-13 PROCEDURE — 1159F MED LIST DOCD IN RCRD: CPT | Performed by: NURSE PRACTITIONER

## 2025-01-13 PROCEDURE — G8420 CALC BMI NORM PARAMETERS: HCPCS | Performed by: NURSE PRACTITIONER

## 2025-01-13 PROCEDURE — 1090F PRES/ABSN URINE INCON ASSESS: CPT | Performed by: NURSE PRACTITIONER

## 2025-01-13 NOTE — PROGRESS NOTES
[facility]  [address], [place] [address]  Office:  [phone]  Fax: [phone]  Chief Complaint   Patient presents with    Follow-up     Cognitive complaints       HPI: Carol Hartman presents today in follow-up for mild cognitive impairment.  She was last seen here on 7/10/2024.  She has undergone neuropsychological evaluation in May 2024.  Her results hovered above the range of mild cognitive impairment. But due to the mild variability in functional status findings and her report, some assistance is needed in some ADLs.     She presents today in follow-up.  Doing ok.   is now in a care facility as of around 4 months ago.  That is a hard transition.  They have been  over 70 years.  She visits him daily.  It was getting hard for her such as lifting him.  Daughter is handling her medications as of about a month ago.  Patient endorses headaches; states she gets a HA over right eye every night and she places finger on it.  Takes aspirin which helps.  Has a contact for her eye condition. It's better when she puts her contact in in the morning.  Not too bothered by arthritis.  Back pain is better after an ablation.  One of her hearing aids is not working; has an appointment for it in February.  Drives short distances and not at night; reportedly doing ok.    Past Medical History:   Diagnosis Date    Anxiety     Hyperlipidemia     Hypertension        Past Surgical History:   Procedure Laterality Date    HYSTERECTOMY (CERVIX STATUS UNKNOWN) N/A     OVARY REMOVAL         Current Outpatient Medications   Medication Sig Dispense Refill    Glucosamine-Chondroitin (GLUCOSAMINE CHONDR COMPLEX PO) Take by mouth      escitalopram (LEXAPRO) 20 MG tablet Take 1 tablet by mouth daily 90 tablet 0    pantoprazole (PROTONIX) 20 MG tablet Take 1 tablet by mouth daily 90 tablet 0    meloxicam (MOBIC) 7.5 MG tablet 1 p.o. daily as needed pain.  Take all doses with food.

## 2025-03-28 RX ORDER — PANTOPRAZOLE SODIUM 20 MG/1
20 TABLET, DELAYED RELEASE ORAL DAILY
Qty: 90 TABLET | Refills: 0 | OUTPATIENT
Start: 2025-03-28

## (undated) DEVICE — MCKA3-17-75-4 AVNS,MULTI-RF,RFQKIT,,17GX75,1: Brand: AVANOS

## (undated) DEVICE — DRAPE TWL SURG 16X26IN BLU ORB04] ALLCARE INC]

## (undated) DEVICE — CUFF BLD PRESSURE MONITORING LNG AD 23-33 CM 1 TUBE MY CUF

## (undated) DEVICE — TRAY SUPP STD NO DRUG W EXTENSION SET

## (undated) DEVICE — BANDAGE ADH W0.75XL3IN UNIV WVN FAB NAT GEN USE STRP N ADH

## (undated) DEVICE — ELECTRODE ES AD DISPER HYDRGEL THN FOAM ADH SCALLOPED EDGE

## (undated) DEVICE — MIRAGE SWIFT II PILLOW LGE: Brand: MIRAGE SWIFT II

## (undated) DEVICE — DRAPE,REIN 53X77,STERILE: Brand: MEDLINE